# Patient Record
Sex: FEMALE | Race: BLACK OR AFRICAN AMERICAN | NOT HISPANIC OR LATINO | Employment: UNEMPLOYED | URBAN - METROPOLITAN AREA
[De-identification: names, ages, dates, MRNs, and addresses within clinical notes are randomized per-mention and may not be internally consistent; named-entity substitution may affect disease eponyms.]

---

## 2018-04-13 ENCOUNTER — HOSPITAL ENCOUNTER (EMERGENCY)
Facility: HOSPITAL | Age: 38
Discharge: HOME | End: 2018-04-14
Attending: EMERGENCY MEDICINE
Payer: MEDICAID

## 2018-04-13 DIAGNOSIS — R10.9 ABDOMINAL PAIN, UNSPECIFIED ABDOMINAL LOCATION: ICD-10-CM

## 2018-04-13 DIAGNOSIS — K86.1 CHRONIC PANCREATITIS, UNSPECIFIED PANCREATITIS TYPE: Primary | ICD-10-CM

## 2018-04-13 LAB
ALBUMIN SERPL BCP-MCNC: 4.1 G/DL
ALP SERPL-CCNC: 107 U/L
ALT SERPL W/O P-5'-P-CCNC: 25 U/L
ANION GAP SERPL CALC-SCNC: 12 MMOL/L
AST SERPL-CCNC: 45 U/L
B-HCG UR QL: NEGATIVE
BACTERIA #/AREA URNS HPF: NORMAL /HPF
BASOPHILS # BLD AUTO: 0.03 K/UL
BASOPHILS NFR BLD: 0.5 %
BILIRUB SERPL-MCNC: 0.3 MG/DL
BILIRUB UR QL STRIP: NEGATIVE
BUN SERPL-MCNC: 6 MG/DL
CALCIUM SERPL-MCNC: 9.6 MG/DL
CHLORIDE SERPL-SCNC: 104 MMOL/L
CLARITY UR: CLEAR
CO2 SERPL-SCNC: 23 MMOL/L
COLOR UR: YELLOW
CREAT SERPL-MCNC: 0.7 MG/DL
DIFFERENTIAL METHOD: ABNORMAL
EOSINOPHIL # BLD AUTO: 0.1 K/UL
EOSINOPHIL NFR BLD: 1.2 %
ERYTHROCYTE [DISTWIDTH] IN BLOOD BY AUTOMATED COUNT: 16.7 %
EST. GFR  (AFRICAN AMERICAN): >60 ML/MIN/1.73 M^2
EST. GFR  (NON AFRICAN AMERICAN): >60 ML/MIN/1.73 M^2
GLUCOSE SERPL-MCNC: 120 MG/DL
GLUCOSE UR QL STRIP: NEGATIVE
HCT VFR BLD AUTO: 33.9 %
HGB BLD-MCNC: 11 G/DL
HGB UR QL STRIP: NEGATIVE
KETONES UR QL STRIP: ABNORMAL
LEUKOCYTE ESTERASE UR QL STRIP: ABNORMAL
LIPASE SERPL-CCNC: 88 U/L
LYMPHOCYTES # BLD AUTO: 1.8 K/UL
LYMPHOCYTES NFR BLD: 30.5 %
MCH RBC QN AUTO: 23.7 PG
MCHC RBC AUTO-ENTMCNC: 32.4 G/DL
MCV RBC AUTO: 73 FL
MICROSCOPIC COMMENT: NORMAL
MONOCYTES # BLD AUTO: 0.5 K/UL
MONOCYTES NFR BLD: 7.9 %
NEUTROPHILS # BLD AUTO: 3.5 K/UL
NEUTROPHILS NFR BLD: 59.9 %
NITRITE UR QL STRIP: NEGATIVE
PH UR STRIP: 6 [PH] (ref 5–8)
PLATELET # BLD AUTO: 297 K/UL
PMV BLD AUTO: 10.3 FL
POTASSIUM SERPL-SCNC: 3.4 MMOL/L
PROT SERPL-MCNC: 8.3 G/DL
PROT UR QL STRIP: NEGATIVE
RBC # BLD AUTO: 4.64 M/UL
SODIUM SERPL-SCNC: 139 MMOL/L
SP GR UR STRIP: 1.02 (ref 1–1.03)
SQUAMOUS #/AREA URNS HPF: 1 /HPF
URN SPEC COLLECT METH UR: ABNORMAL
UROBILINOGEN UR STRIP-ACNC: NEGATIVE EU/DL
WBC # BLD AUTO: 5.8 K/UL
WBC #/AREA URNS HPF: 2 /HPF (ref 0–5)

## 2018-04-13 PROCEDURE — 99285 EMERGENCY DEPT VISIT HI MDM: CPT | Mod: 25

## 2018-04-13 PROCEDURE — 81025 URINE PREGNANCY TEST: CPT

## 2018-04-13 PROCEDURE — 96376 TX/PRO/DX INJ SAME DRUG ADON: CPT

## 2018-04-13 PROCEDURE — 63600175 PHARM REV CODE 636 W HCPCS: Performed by: EMERGENCY MEDICINE

## 2018-04-13 PROCEDURE — 83690 ASSAY OF LIPASE: CPT

## 2018-04-13 PROCEDURE — 85025 COMPLETE CBC W/AUTO DIFF WBC: CPT

## 2018-04-13 PROCEDURE — 96375 TX/PRO/DX INJ NEW DRUG ADDON: CPT

## 2018-04-13 PROCEDURE — 25000003 PHARM REV CODE 250: Performed by: EMERGENCY MEDICINE

## 2018-04-13 PROCEDURE — 81000 URINALYSIS NONAUTO W/SCOPE: CPT

## 2018-04-13 PROCEDURE — 96374 THER/PROPH/DIAG INJ IV PUSH: CPT | Mod: 59

## 2018-04-13 PROCEDURE — 80053 COMPREHEN METABOLIC PANEL: CPT

## 2018-04-13 PROCEDURE — 96361 HYDRATE IV INFUSION ADD-ON: CPT

## 2018-04-13 RX ORDER — DIPHENHYDRAMINE HYDROCHLORIDE 50 MG/ML
25 INJECTION INTRAMUSCULAR; INTRAVENOUS
Status: COMPLETED | OUTPATIENT
Start: 2018-04-13 | End: 2018-04-13

## 2018-04-13 RX ORDER — DIPHENHYDRAMINE HCL 25 MG
25 CAPSULE ORAL
Status: COMPLETED | OUTPATIENT
Start: 2018-04-13 | End: 2018-04-13

## 2018-04-13 RX ORDER — MORPHINE SULFATE 4 MG/ML
4 INJECTION, SOLUTION INTRAMUSCULAR; INTRAVENOUS
Status: COMPLETED | OUTPATIENT
Start: 2018-04-13 | End: 2018-04-13

## 2018-04-13 RX ORDER — ONDANSETRON 2 MG/ML
4 INJECTION INTRAMUSCULAR; INTRAVENOUS
Status: COMPLETED | OUTPATIENT
Start: 2018-04-13 | End: 2018-04-13

## 2018-04-13 RX ADMIN — DIPHENHYDRAMINE HYDROCHLORIDE 25 MG: 50 INJECTION, SOLUTION INTRAMUSCULAR; INTRAVENOUS at 09:04

## 2018-04-13 RX ADMIN — ONDANSETRON 4 MG: 2 INJECTION INTRAMUSCULAR; INTRAVENOUS at 09:04

## 2018-04-13 RX ADMIN — SODIUM CHLORIDE 1000 ML: 0.9 INJECTION, SOLUTION INTRAVENOUS at 09:04

## 2018-04-13 RX ADMIN — MORPHINE SULFATE 4 MG: 4 INJECTION INTRAVENOUS at 10:04

## 2018-04-13 RX ADMIN — DIPHENHYDRAMINE HYDROCHLORIDE 25 MG: 25 CAPSULE ORAL at 11:04

## 2018-04-13 RX ADMIN — MORPHINE SULFATE 4 MG: 4 INJECTION INTRAVENOUS at 09:04

## 2018-04-14 VITALS
RESPIRATION RATE: 18 BRPM | SYSTOLIC BLOOD PRESSURE: 130 MMHG | TEMPERATURE: 99 F | OXYGEN SATURATION: 97 % | HEIGHT: 64 IN | HEART RATE: 85 BPM | DIASTOLIC BLOOD PRESSURE: 90 MMHG

## 2018-04-14 PROBLEM — R10.9 ABDOMINAL PAIN: Status: ACTIVE | Noted: 2018-04-14

## 2018-04-14 PROCEDURE — 25000003 PHARM REV CODE 250: Performed by: EMERGENCY MEDICINE

## 2018-04-14 PROCEDURE — 25500020 PHARM REV CODE 255: Performed by: EMERGENCY MEDICINE

## 2018-04-14 RX ORDER — ONDANSETRON 4 MG/1
4 TABLET, FILM COATED ORAL EVERY 8 HOURS PRN
Qty: 12 TABLET | Refills: 0 | Status: SHIPPED | OUTPATIENT
Start: 2018-04-14

## 2018-04-14 RX ORDER — NAPROXEN 500 MG/1
500 TABLET ORAL
Status: COMPLETED | OUTPATIENT
Start: 2018-04-14 | End: 2018-04-14

## 2018-04-14 RX ORDER — NAPROXEN 500 MG/1
500 TABLET ORAL 2 TIMES DAILY WITH MEALS
Qty: 20 TABLET | Refills: 0 | Status: ON HOLD | OUTPATIENT
Start: 2018-04-14 | End: 2023-12-28 | Stop reason: HOSPADM

## 2018-04-14 RX ORDER — TRAMADOL HYDROCHLORIDE 50 MG/1
50 TABLET ORAL EVERY 6 HOURS PRN
Qty: 12 TABLET | Refills: 0 | Status: SHIPPED | OUTPATIENT
Start: 2018-04-14 | End: 2018-04-24

## 2018-04-14 RX ADMIN — IOHEXOL 75 ML: 350 INJECTION, SOLUTION INTRAVENOUS at 12:04

## 2018-04-14 RX ADMIN — NAPROXEN 500 MG: 500 TABLET ORAL at 01:04

## 2018-04-14 NOTE — DISCHARGE INSTRUCTIONS
Regarding ABDOMINAL PAIN, I recommended that the patient: Sip water or other clear fluids; avoid solid food for the first few hours after vomiting or diarrhea; if vomiting, wait 6 hours, and then eat small amounts of mild foods such as rice, applesauce, or crackers; avoid dairy products; avoid citrus, high-fat foods, fried or greasy foods, tomato products, caffeine, alcohol, and carbonated beverages;  avoid aspirin, ibuprofen or other anti-inflammatory medications, and narcotic pain medications unless prescribed.  In regards to prevention, I encouraged patient to:  Avoid fatty or greasy foods; drink plenty of water each day; eat small meals more frequently; exercise regularly; limit foods that produce gas; make sure meals are well-balanced and high in fiber and include plenty of fruits and vegetables.

## 2018-04-14 NOTE — ED PROVIDER NOTES
SCRIBE #1 NOTE: I, Saul Chappell, am scribing for, and in the presence of, Delmer Garvey MD. I have scribed the HPI, ROS, and PEx.     SCRIBE #2 NOTE: I, Na Anaya, am scribing for, and in the presence of,  Jose Enrique Walden Jr., MD. I have scribed the remaining portions of the note not scribed by Scribe #1.     History      Chief Complaint   Patient presents with    Abdominal Pain     reports hx of pancreatitis with nausea vomiting and diarrhea today        Review of patient's allergies indicates:   Allergen Reactions    Ace inhibitors Swelling     angioedema    Pcn [penicillins] Hives        HPI   HPI    4/13/2018, 8:40 PM   History obtained from the patient      History of Present Illness: Avril Mallory is a 37 y.o. female patient who presents to the Emergency Department for an evaluation of abdominal pain which onset gradually x2 days ago. Pt reports a hx of pancreatitis and states her current pain is similar. Pt reports she attempted to meet her PCP concerning her sx but was advised to come here due to currently changing her insurance. Symptoms are constant and moderate in severity. Exacerbated by palpation and relieved by nothing. Associated sxs include N/V/D. Patient denies any fever, chills, dysuria, hematuria, constipation, blood in stool, and all other sxs at this time. No further complaints or concerns at this time.     Arrival mode: Personal vehicle    PCP: Melisa Yang MD       Past Medical History:  Past Medical History:   Diagnosis Date    Hypertension     Pancreatitis     Pancreatitis        Past Surgical History:  Past Surgical History:   Procedure Laterality Date    GASTRIC BYPASS           Family History:  Family History   Problem Relation Age of Onset    Cancer Mother     No Known Problems Father        Social History:  Social History     Social History Main Topics    Smoking status: Never Smoker    Smokeless tobacco: Unknown    Alcohol use Yes      Comment: ocassional     Drug use: No    Sexual activity: Unknown       ROS   Review of Systems   Constitutional: Negative for activity change, appetite change, chills and fever.   HENT: Negative for congestion, sore throat and trouble swallowing.    Respiratory: Negative for cough and shortness of breath.    Cardiovascular: Negative for chest pain and leg swelling.   Gastrointestinal: Positive for abdominal pain, diarrhea, nausea and vomiting. Negative for abdominal distention, blood in stool and constipation.   Genitourinary: Negative for dysuria, frequency, hematuria, pelvic pain, urgency, vaginal bleeding and vaginal discharge.   Musculoskeletal: Negative for back pain and neck pain.   Skin: Negative for rash.   Neurological: Negative for dizziness, weakness, light-headedness and headaches.     Physical Exam      Initial Vitals [04/13/18 2012]   BP Pulse Resp Temp SpO2   (!) 160/99 (!) 111 20 98.6 °F (37 °C) 98 %      MAP       119.33          Physical Exam  Nursing Notes and Vital Signs Reviewed.  Constitutional: Patient is in no acute distress. Well-developed and well-nourished.  Head: Atraumatic. Normocephalic.  Eyes: PERRL. EOM intact. Conjunctivae are not pale. No scleral icterus.  ENT: Mucous membranes are moist. Oropharynx is clear and symmetric.    Neck: Supple. Full ROM. No lymphadenopathy.  Cardiovascular: Regular rate. Regular rhythm.  Pulmonary/Chest: No respiratory distress. Clear to auscultation bilaterally. No wheezing or rales.  Abdominal: Soft and non-distended.  Epigastric tenderness. No guarding or rebound noted.   Musculoskeletal: Moves all extremities. No obvious deformities. No edema. No calf tenderness.  Skin: Warm and dry.  Neurological:  Alert, awake, and appropriate.  Normal speech.  No acute focal neurological deficits are appreciated.  Psychiatric: Normal affect. Good eye contact. Appropriate in content.    ED Course    Procedures  ED Vital Signs:  Vitals:    04/13/18 2012 04/13/18 2227 04/13/18 2330  "04/14/18 0040   BP: (!) 160/99 (!) 140/93 (!) 145/91 (!) 130/90   Pulse: (!) 111 90 91 85   Resp: 20 18 18 18   Temp: 98.6 °F (37 °C)      TempSrc: Oral      SpO2: 98% 97% 97% 97%   Height: 5' 4" (1.626 m)          Abnormal Lab Results:  Labs Reviewed   CBC W/ AUTO DIFFERENTIAL - Abnormal; Notable for the following:        Result Value    Hemoglobin 11.0 (*)     Hematocrit 33.9 (*)     MCV 73 (*)     MCH 23.7 (*)     RDW 16.7 (*)     All other components within normal limits   COMPREHENSIVE METABOLIC PANEL - Abnormal; Notable for the following:     Potassium 3.4 (*)     Glucose 120 (*)     AST 45 (*)     All other components within normal limits   LIPASE - Abnormal; Notable for the following:     Lipase 88 (*)     All other components within normal limits   URINALYSIS - Abnormal; Notable for the following:     Ketones, UA Trace (*)     Leukocytes, UA Trace (*)     All other components within normal limits   PREGNANCY TEST, URINE RAPID   URINALYSIS MICROSCOPIC        All Lab Results:  Results for orders placed or performed during the hospital encounter of 04/13/18   CBC W/ AUTO DIFFERENTIAL   Result Value Ref Range    WBC 5.80 3.90 - 12.70 K/uL    RBC 4.64 4.00 - 5.40 M/uL    Hemoglobin 11.0 (L) 12.0 - 16.0 g/dL    Hematocrit 33.9 (L) 37.0 - 48.5 %    MCV 73 (L) 82 - 98 fL    MCH 23.7 (L) 27.0 - 31.0 pg    MCHC 32.4 32.0 - 36.0 g/dL    RDW 16.7 (H) 11.5 - 14.5 %    Platelets 297 150 - 350 K/uL    MPV 10.3 9.2 - 12.9 fL    Gran # (ANC) 3.5 1.8 - 7.7 K/uL    Lymph # 1.8 1.0 - 4.8 K/uL    Mono # 0.5 0.3 - 1.0 K/uL    Eos # 0.1 0.0 - 0.5 K/uL    Baso # 0.03 0.00 - 0.20 K/uL    Gran% 59.9 38.0 - 73.0 %    Lymph% 30.5 18.0 - 48.0 %    Mono% 7.9 4.0 - 15.0 %    Eosinophil% 1.2 0.0 - 8.0 %    Basophil% 0.5 0.0 - 1.9 %    Differential Method Automated    Comp. Metabolic Panel   Result Value Ref Range    Sodium 139 136 - 145 mmol/L    Potassium 3.4 (L) 3.5 - 5.1 mmol/L    Chloride 104 95 - 110 mmol/L    CO2 23 23 - 29 mmol/L "    Glucose 120 (H) 70 - 110 mg/dL    BUN, Bld 6 6 - 20 mg/dL    Creatinine 0.7 0.5 - 1.4 mg/dL    Calcium 9.6 8.7 - 10.5 mg/dL    Total Protein 8.3 6.0 - 8.4 g/dL    Albumin 4.1 3.5 - 5.2 g/dL    Total Bilirubin 0.3 0.1 - 1.0 mg/dL    Alkaline Phosphatase 107 55 - 135 U/L    AST 45 (H) 10 - 40 U/L    ALT 25 10 - 44 U/L    Anion Gap 12 8 - 16 mmol/L    eGFR if African American >60 >60 mL/min/1.73 m^2    eGFR if non African American >60 >60 mL/min/1.73 m^2   Lipase   Result Value Ref Range    Lipase 88 (H) 4 - 60 U/L   Urinalysis - Clean Catch   Result Value Ref Range    Specimen UA Urine, Clean Catch     Color, UA Yellow Yellow, Straw, Eliane    Appearance, UA Clear Clear    pH, UA 6.0 5.0 - 8.0    Specific Gravity, UA 1.020 1.005 - 1.030    Protein, UA Negative Negative    Glucose, UA Negative Negative    Ketones, UA Trace (A) Negative    Bilirubin (UA) Negative Negative    Occult Blood UA Negative Negative    Nitrite, UA Negative Negative    Urobilinogen, UA Negative <2.0 EU/dL    Leukocytes, UA Trace (A) Negative   Pregnancy, urine rapid   Result Value Ref Range    Preg Test, Ur Negative    Urinalysis Microscopic   Result Value Ref Range    WBC, UA 2 0 - 5 /hpf    Bacteria, UA Rare None-Occ /hpf    Squam Epithel, UA 1 /hpf    Microscopic Comment SEE COMMENT        Imaging Results:  Imaging Results          CT Abdomen Pelvis With Contrast (Final result)  Result time 04/14/18 08:01:01    Final result by Brenda Levine MD (04/14/18 08:01:01)                 Impression:      There are no acute findings.  No CT evidence of pancreatitis.            All CT scans at this facility use dose modulation, iterative reconstruction, and/or weight based dosing when appropriate to reduce radiation dose to as low as reasonably achievable.      Electronically signed by: BRENDA LEVINE MD  Date:     04/14/18  Time:    08:01              Narrative:    EXAM: CT ABDOMEN PELVIS WITH CONTRAST    CLINICAL HISTORY: 2 days of increasing  abdominal pain with history of pancreatitis and gastric bypass surgeries     TECHNIQUE: Axial CT scan through the abdomen and pelvis following IV and without oral contrast.    COMPARISON STUDIES: October 24, 2013 abdomen CT    FINDINGS:    Abdomen CT:   A normal liver, spleen, adrenals.  Mild symmetrical fullness renal collecting systems with no calculi.  Symmetric enhancement.  Cholecystectomy.  Normal enhancement pancreas without inflammatory change or fluid.  Postsurgical changes from gastric bypass procedure.  The bowel is nonobstructed.  A normal appendix.  No abdominal hernias.  Normal enhancement of the vasculature.    Pelvis CT:   No fluid collections.  The uterus and adnexa are unremarkable.  No hernias or adenopathy.  Minimal smooth, concentric bladder wall prominence without surrounding inflammatory change.                             X-Ray Abdomen Flat And Erect (Final result)  Result time 04/13/18 23:12:06    Final result by Danica Stern MD (Timothy) (04/13/18 23:12:06)                 Impression:     Nonspecific, nonobstructed bowel gas pattern.      Electronically signed by: DANICA STERN MD  Date:     04/13/18  Time:    23:12              Narrative:    Abdomen, 2 views    Clinical history: abdominal pain    Findings: Previous cholecystectomy.  Nonspecific, nonobstructed bowel gas pattern.  There is contrast noted in the small bowel.  There is an average amount of stool present. There are no unusual calcifications. The bones are unremarkable. There is no intraperitoneal free air. The lung bases are clear.                             12:52 AM: Per [facility] radiology, pt's CT results: Postsurgical change related to Mervin-en-Y gastric bypass surgery without evidence of bowel obstruction or other acute abnormality. The appendix is at the upper limits of normal in caliber measuring 8mm, otherwise the appendix appears normal.            The Emergency Provider reviewed the vital signs and test results, which  are outlined above.    ED Discussion     9:24 PM: Re-evaluated pt. Pt is requesting pain medication and reports that she normally gets morphine for her abdominal pain but has to have it with benadryl because morphine makes her itch.    11:08 PM: Dr. Garvey transfers care of pt to Dr. Walden, pending imaging results.    1:09 AM: Reassessed pt at this time. Agree with Dr. Garvey's assessment. Pt is AAOx3, in NAD, and VSS. Pt states her condition has improved at this time. Discussed with pt all pertinent ED information and results. Advised pt to f/u outpatient with her PCP and GI. Discussed pt dx and plan of tx. Gave pt all f/u and return to the ED instructions. All questions and concerns were addressed at this time. Pt expresses understanding of information and instructions, and is comfortable with plan to discharge. Pt is stable for discharge.    I discussed with patient and/or family/caretaker that evaluation in the ED does not suggest any emergent or life threatening medical conditions requiring immediate intervention beyond what was provided in the ED, and I believe patient is safe for discharge.  Regardless, an unremarkable evaluation in the ED does not preclude the development or presence of a serious of life threatening condition. As such, patient was instructed to return immediately for any worsening or change in current symptoms.    ED Medication(s):  Medications   sodium chloride 0.9% bolus 1,000 mL (0 mLs Intravenous Stopped 4/13/18 2227)   ondansetron injection 4 mg (4 mg Intravenous Given 4/13/18 2114)   morphine injection 4 mg (4 mg Intravenous Given 4/13/18 2130)   diphenhydrAMINE injection 25 mg (25 mg Intravenous Given 4/13/18 2129)   morphine injection 4 mg (4 mg Intravenous Given 4/13/18 2234)   diphenhydrAMINE capsule 25 mg (25 mg Oral Given 4/13/18 2333)   omnipaque 350 iohexol 75 mL (75 mLs Intravenous Given 4/14/18 0003)   naproxen tablet 500 mg (500 mg Oral Given 4/14/18 0110)       Discharge  Medication List as of 4/14/2018  1:11 AM      START taking these medications    Details   naproxen (NAPROSYN) 500 MG tablet Take 1 tablet (500 mg total) by mouth 2 (two) times daily with meals., Starting Sat 4/14/2018, Print      ondansetron (ZOFRAN) 4 MG tablet Take 1 tablet (4 mg total) by mouth every 8 (eight) hours as needed., Starting Sat 4/14/2018, Print      traMADol (ULTRAM) 50 mg tablet Take 1 tablet (50 mg total) by mouth every 6 (six) hours as needed for Pain., Starting Sat 4/14/2018, Until Tue 4/24/2018, Print             Follow-up Information     Melisa Yang MD. Schedule an appointment as soon as possible for a visit in 1 week.    Specialty:  Internal Medicine  Contact information:  [address] [facility] [address]  [phone]             [facility] - Internal Medicine. Schedule an appointment as soon as possible for a visit in 1 week.    Specialty:  Internal Medicine  Contact information:  [address] [facility] [id number] [phone]  Additional information:  (off [email or web address]) 1st floor           [facility] - Gastroenterology. Schedule an appointment as soon as possible for a visit in 1 week.    Specialty:  Gastroenterology  Contact information:  [address] [facility] [id number] [phone]  Additional information:  (off [email or web address]) 3rd flood                   Medical Decision Making    Medical Decision Making:   Clinical Tests:   Lab Tests: Ordered and Reviewed  Radiological Study: Reviewed and Ordered           Scribe Attestation:   Scribe #1: I performed the above scribed service and the documentation accurately describes the services I performed. I attest to the accuracy of the note.    Attending:   Physician Attestation Statement for Scribe #1: I, Delmer Garvey MD, personally performed the services described in this documentation, as scribed by Saul Chappell, in my presence, and it is both accurate and complete.       Scribe Attestation:   Scribe  #2: I performed the above scribed service and the documentation accurately describes the services I performed. I attest to the accuracy of the note.    Attending Attestation:           Physician Attestation for Scribe:    Physician Attestation Statement for Scribe #2: I, Jose Enrique Walden Jr., MD, reviewed documentation, as scribed by Na Anaya in my presence, and it is both accurate and complete. I also acknowledge and confirm the content of the note done by Ranjana #1.          Clinical Impression       ICD-10-CM ICD-9-CM   1. Chronic pancreatitis, unspecified pancreatitis type K86.1 577.1   2. Abdominal pain, unspecified abdominal location R10.9 789.00       Disposition:   Disposition: Discharged  Condition: Stable         Jose Enrique Walden Jr., MD  04/16/18 1690

## 2023-12-27 ENCOUNTER — HOSPITAL ENCOUNTER (INPATIENT)
Facility: HOSPITAL | Age: 43
LOS: 1 days | Discharge: HOME | DRG: 812 | End: 2023-12-28
Attending: EMERGENCY MEDICINE | Admitting: INTERNAL MEDICINE
Payer: MEDICARE

## 2023-12-27 DIAGNOSIS — D50.9 IRON DEFICIENCY ANEMIA, UNSPECIFIED IRON DEFICIENCY ANEMIA TYPE: Primary | ICD-10-CM

## 2023-12-27 DIAGNOSIS — K74.60 HEPATIC CIRRHOSIS, UNSPECIFIED HEPATIC CIRRHOSIS TYPE, UNSPECIFIED WHETHER ASCITES PRESENT: ICD-10-CM

## 2023-12-27 DIAGNOSIS — R07.9 CHEST PAIN: ICD-10-CM

## 2023-12-27 DIAGNOSIS — R07.9 CHEST PAIN, UNSPECIFIED TYPE: ICD-10-CM

## 2023-12-27 PROBLEM — R32 URINARY INCONTINENCE: Status: ACTIVE | Noted: 2020-07-15

## 2023-12-27 PROBLEM — R60.0 BILATERAL LOWER EXTREMITY EDEMA: Status: ACTIVE | Noted: 2019-08-21

## 2023-12-27 PROBLEM — F41.9 ANXIETY: Status: ACTIVE | Noted: 2020-12-24

## 2023-12-27 PROBLEM — K83.8 DILATED BILE DUCT: Status: ACTIVE | Noted: 2017-07-24

## 2023-12-27 PROBLEM — R19.7 DIARRHEA: Status: ACTIVE | Noted: 2018-02-22

## 2023-12-27 PROBLEM — E03.8 OTHER SPECIFIED HYPOTHYROIDISM: Status: ACTIVE | Noted: 2020-12-24

## 2023-12-27 PROBLEM — I87.2 VENOUS STASIS DERMATITIS OF BOTH LOWER EXTREMITIES: Status: ACTIVE | Noted: 2020-04-22

## 2023-12-27 PROBLEM — F10.10 ALCOHOL ABUSE: Status: ACTIVE | Noted: 2018-03-31

## 2023-12-27 PROBLEM — K92.2 GI BLEED: Status: ACTIVE | Noted: 2023-12-27

## 2023-12-27 PROBLEM — F12.10 MARIJUANA ABUSE: Status: ACTIVE | Noted: 2018-03-31

## 2023-12-27 PROBLEM — D50.8 IRON DEFICIENCY ANEMIA SECONDARY TO INADEQUATE DIETARY IRON INTAKE: Status: ACTIVE | Noted: 2020-11-16

## 2023-12-27 PROBLEM — Z98.84 HISTORY OF GASTRIC BYPASS: Chronic | Status: ACTIVE | Noted: 2017-08-24

## 2023-12-27 PROBLEM — K70.31 ALCOHOLIC CIRRHOSIS OF LIVER WITH ASCITES: Status: ACTIVE | Noted: 2019-09-03

## 2023-12-27 PROBLEM — D64.9 SEVERE ANEMIA: Status: ACTIVE | Noted: 2023-12-27

## 2023-12-27 PROBLEM — R17 TOTAL BILIRUBIN, ELEVATED: Status: ACTIVE | Noted: 2020-11-12

## 2023-12-27 PROBLEM — K72.90 DECOMPENSATED HEPATIC CIRRHOSIS: Status: ACTIVE | Noted: 2020-12-05

## 2023-12-27 PROBLEM — G62.9 NEUROPATHY: Chronic | Status: ACTIVE | Noted: 2020-07-15

## 2023-12-27 PROBLEM — E43 SEVERE PROTEIN-CALORIE MALNUTRITION: Status: ACTIVE | Noted: 2020-11-16

## 2023-12-27 LAB
ABO + RH BLD: NORMAL
ALBUMIN SERPL BCP-MCNC: 2.7 G/DL (ref 3.5–5.2)
ALP SERPL-CCNC: 160 U/L (ref 55–135)
ALT SERPL W/O P-5'-P-CCNC: 17 U/L (ref 10–44)
AMMONIA PLAS-SCNC: 102 UMOL/L (ref 10–50)
ANION GAP SERPL CALC-SCNC: 10 MMOL/L (ref 8–16)
ANISOCYTOSIS BLD QL SMEAR: SLIGHT
APTT PPP: 30.2 SEC (ref 21–32)
AST SERPL-CCNC: 82 U/L (ref 10–40)
BASOPHILS # BLD AUTO: 0.02 K/UL (ref 0–0.2)
BASOPHILS # BLD AUTO: 0.04 K/UL (ref 0–0.2)
BASOPHILS NFR BLD: 0.3 % (ref 0–1.9)
BASOPHILS NFR BLD: 0.5 % (ref 0–1.9)
BILIRUB SERPL-MCNC: 2.4 MG/DL (ref 0.1–1)
BLD GP AB SCN CELLS X3 SERPL QL: NORMAL
BLD PROD TYP BPU: NORMAL
BLOOD UNIT EXPIRATION DATE: NORMAL
BLOOD UNIT TYPE CODE: 5100
BLOOD UNIT TYPE: NORMAL
BNP SERPL-MCNC: 222 PG/ML (ref 0–99)
BUN SERPL-MCNC: 17 MG/DL (ref 6–20)
CALCIUM SERPL-MCNC: 7.6 MG/DL (ref 8.7–10.5)
CHLORIDE SERPL-SCNC: 107 MMOL/L (ref 95–110)
CO2 SERPL-SCNC: 21 MMOL/L (ref 23–29)
CODING SYSTEM: NORMAL
CREAT SERPL-MCNC: 1.1 MG/DL (ref 0.5–1.4)
CROSSMATCH INTERPRETATION: NORMAL
DIFFERENTIAL METHOD BLD: ABNORMAL
DIFFERENTIAL METHOD BLD: ABNORMAL
DISPENSE STATUS: NORMAL
EOSINOPHIL # BLD AUTO: 0 K/UL (ref 0–0.5)
EOSINOPHIL # BLD AUTO: 0 K/UL (ref 0–0.5)
EOSINOPHIL NFR BLD: 0.4 % (ref 0–8)
EOSINOPHIL NFR BLD: 0.4 % (ref 0–8)
ERYTHROCYTE [DISTWIDTH] IN BLOOD BY AUTOMATED COUNT: 16.5 % (ref 11.5–14.5)
ERYTHROCYTE [DISTWIDTH] IN BLOOD BY AUTOMATED COUNT: 22 % (ref 11.5–14.5)
EST. GFR  (NO RACE VARIABLE): >60 ML/MIN/1.73 M^2
FERRITIN SERPL-MCNC: 29 NG/ML (ref 20–300)
GLUCOSE SERPL-MCNC: 121 MG/DL (ref 70–110)
HCT VFR BLD AUTO: 15.3 % (ref 37–48.5)
HCT VFR BLD AUTO: 21.8 % (ref 37–48.5)
HCV AB SERPL QL IA: NEGATIVE
HEP C VIRUS HOLD SPECIMEN: NORMAL
HGB BLD-MCNC: 4.5 G/DL (ref 12–16)
HGB BLD-MCNC: 6.9 G/DL (ref 12–16)
HIV 1+2 AB+HIV1 P24 AG SERPL QL IA: NEGATIVE
HYPOCHROMIA BLD QL SMEAR: ABNORMAL
IMM GRANULOCYTES # BLD AUTO: 0.05 K/UL (ref 0–0.04)
IMM GRANULOCYTES # BLD AUTO: 0.07 K/UL (ref 0–0.04)
IMM GRANULOCYTES NFR BLD AUTO: 0.7 % (ref 0–0.5)
IMM GRANULOCYTES NFR BLD AUTO: 0.9 % (ref 0–0.5)
INR PPP: 1.9 (ref 0.8–1.2)
LIPASE SERPL-CCNC: 11 U/L (ref 4–60)
LYMPHOCYTES # BLD AUTO: 0.9 K/UL (ref 1–4.8)
LYMPHOCYTES # BLD AUTO: 1.2 K/UL (ref 1–4.8)
LYMPHOCYTES NFR BLD: 11.9 % (ref 18–48)
LYMPHOCYTES NFR BLD: 14.7 % (ref 18–48)
MAGNESIUM SERPL-MCNC: 1.7 MG/DL (ref 1.6–2.6)
MCH RBC QN AUTO: 21.2 PG (ref 27–31)
MCH RBC QN AUTO: 24.7 PG (ref 27–31)
MCHC RBC AUTO-ENTMCNC: 29.4 G/DL (ref 32–36)
MCHC RBC AUTO-ENTMCNC: 31.7 G/DL (ref 32–36)
MCV RBC AUTO: 72 FL (ref 82–98)
MCV RBC AUTO: 78 FL (ref 82–98)
MONOCYTES # BLD AUTO: 0.6 K/UL (ref 0.3–1)
MONOCYTES # BLD AUTO: 0.9 K/UL (ref 0.3–1)
MONOCYTES NFR BLD: 11.4 % (ref 4–15)
MONOCYTES NFR BLD: 7.4 % (ref 4–15)
NEUTROPHILS # BLD AUTO: 5.9 K/UL (ref 1.8–7.7)
NEUTROPHILS # BLD AUTO: 5.9 K/UL (ref 1.8–7.7)
NEUTROPHILS NFR BLD: 72.1 % (ref 38–73)
NEUTROPHILS NFR BLD: 79.3 % (ref 38–73)
NRBC BLD-RTO: 2 /100 WBC
NRBC BLD-RTO: 5 /100 WBC
NUM UNITS TRANS PACKED RBC: NORMAL
OB PNL STL: POSITIVE
OVALOCYTES BLD QL SMEAR: ABNORMAL
PHOSPHATE SERPL-MCNC: 1.6 MG/DL (ref 2.7–4.5)
PLATELET # BLD AUTO: 67 K/UL (ref 150–450)
PLATELET # BLD AUTO: 68 K/UL (ref 150–450)
PMV BLD AUTO: ABNORMAL FL (ref 9.2–12.9)
PMV BLD AUTO: ABNORMAL FL (ref 9.2–12.9)
POLYCHROMASIA BLD QL SMEAR: ABNORMAL
POTASSIUM SERPL-SCNC: 3.6 MMOL/L (ref 3.5–5.1)
PROT SERPL-MCNC: 5.6 G/DL (ref 6–8.4)
PROTHROMBIN TIME: 18.8 SEC (ref 9–12.5)
RBC # BLD AUTO: 2.12 M/UL (ref 4–5.4)
RBC # BLD AUTO: 2.79 M/UL (ref 4–5.4)
SODIUM SERPL-SCNC: 138 MMOL/L (ref 136–145)
SPECIMEN OUTDATE: NORMAL
TARGETS BLD QL SMEAR: ABNORMAL
TROPONIN I SERPL DL<=0.01 NG/ML-MCNC: <0.006 NG/ML (ref 0–0.03)
TROPONIN I SERPL DL<=0.01 NG/ML-MCNC: <0.006 NG/ML (ref 0–0.03)
WBC # BLD AUTO: 7.46 K/UL (ref 3.9–12.7)
WBC # BLD AUTO: 8.18 K/UL (ref 3.9–12.7)

## 2023-12-27 PROCEDURE — 83036 HEMOGLOBIN GLYCOSYLATED A1C: CPT | Performed by: NURSE PRACTITIONER

## 2023-12-27 PROCEDURE — 83735 ASSAY OF MAGNESIUM: CPT | Performed by: NURSE PRACTITIONER

## 2023-12-27 PROCEDURE — 83690 ASSAY OF LIPASE: CPT | Performed by: EMERGENCY MEDICINE

## 2023-12-27 PROCEDURE — 96376 TX/PRO/DX INJ SAME DRUG ADON: CPT

## 2023-12-27 PROCEDURE — 83540 ASSAY OF IRON: CPT | Performed by: NURSE PRACTITIONER

## 2023-12-27 PROCEDURE — 85025 COMPLETE CBC W/AUTO DIFF WBC: CPT | Mod: 91 | Performed by: NURSE PRACTITIONER

## 2023-12-27 PROCEDURE — 21400001 HC TELEMETRY ROOM

## 2023-12-27 PROCEDURE — 25000003 PHARM REV CODE 250: Performed by: INTERNAL MEDICINE

## 2023-12-27 PROCEDURE — 36430 TRANSFUSION BLD/BLD COMPNT: CPT

## 2023-12-27 PROCEDURE — 86901 BLOOD TYPING SEROLOGIC RH(D): CPT | Performed by: EMERGENCY MEDICINE

## 2023-12-27 PROCEDURE — 83880 ASSAY OF NATRIURETIC PEPTIDE: CPT | Performed by: EMERGENCY MEDICINE

## 2023-12-27 PROCEDURE — 93005 ELECTROCARDIOGRAM TRACING: CPT

## 2023-12-27 PROCEDURE — 85730 THROMBOPLASTIN TIME PARTIAL: CPT | Performed by: EMERGENCY MEDICINE

## 2023-12-27 PROCEDURE — 36415 COLL VENOUS BLD VENIPUNCTURE: CPT | Performed by: EMERGENCY MEDICINE

## 2023-12-27 PROCEDURE — 63600175 PHARM REV CODE 636 W HCPCS: Performed by: EMERGENCY MEDICINE

## 2023-12-27 PROCEDURE — 30233N1 TRANSFUSION OF NONAUTOLOGOUS RED BLOOD CELLS INTO PERIPHERAL VEIN, PERCUTANEOUS APPROACH: ICD-10-PCS | Performed by: EMERGENCY MEDICINE

## 2023-12-27 PROCEDURE — 85025 COMPLETE CBC W/AUTO DIFF WBC: CPT | Performed by: EMERGENCY MEDICINE

## 2023-12-27 PROCEDURE — 99285 EMERGENCY DEPT VISIT HI MDM: CPT | Mod: 25

## 2023-12-27 PROCEDURE — 96374 THER/PROPH/DIAG INJ IV PUSH: CPT

## 2023-12-27 PROCEDURE — 84100 ASSAY OF PHOSPHORUS: CPT | Performed by: NURSE PRACTITIONER

## 2023-12-27 PROCEDURE — 82272 OCCULT BLD FECES 1-3 TESTS: CPT | Performed by: EMERGENCY MEDICINE

## 2023-12-27 PROCEDURE — 80053 COMPREHEN METABOLIC PANEL: CPT | Performed by: EMERGENCY MEDICINE

## 2023-12-27 PROCEDURE — 82140 ASSAY OF AMMONIA: CPT | Performed by: NURSE PRACTITIONER

## 2023-12-27 PROCEDURE — 85610 PROTHROMBIN TIME: CPT | Performed by: EMERGENCY MEDICINE

## 2023-12-27 PROCEDURE — 84484 ASSAY OF TROPONIN QUANT: CPT | Performed by: EMERGENCY MEDICINE

## 2023-12-27 PROCEDURE — 93010 ELECTROCARDIOGRAM REPORT: CPT | Mod: ,,, | Performed by: INTERNAL MEDICINE

## 2023-12-27 PROCEDURE — 36415 COLL VENOUS BLD VENIPUNCTURE: CPT | Mod: XB | Performed by: NURSE PRACTITIONER

## 2023-12-27 PROCEDURE — 86803 HEPATITIS C AB TEST: CPT | Performed by: EMERGENCY MEDICINE

## 2023-12-27 PROCEDURE — 63600175 PHARM REV CODE 636 W HCPCS: Performed by: NURSE PRACTITIONER

## 2023-12-27 PROCEDURE — 82728 ASSAY OF FERRITIN: CPT | Performed by: NURSE PRACTITIONER

## 2023-12-27 PROCEDURE — 25000003 PHARM REV CODE 250: Performed by: NURSE PRACTITIONER

## 2023-12-27 PROCEDURE — 87389 HIV-1 AG W/HIV-1&-2 AB AG IA: CPT | Performed by: EMERGENCY MEDICINE

## 2023-12-27 PROCEDURE — 99499 UNLISTED E&M SERVICE: CPT | Mod: ,,, | Performed by: INTERNAL MEDICINE

## 2023-12-27 PROCEDURE — 96375 TX/PRO/DX INJ NEW DRUG ADDON: CPT

## 2023-12-27 PROCEDURE — 96361 HYDRATE IV INFUSION ADD-ON: CPT

## 2023-12-27 PROCEDURE — C9113 INJ PANTOPRAZOLE SODIUM, VIA: HCPCS | Performed by: NURSE PRACTITIONER

## 2023-12-27 PROCEDURE — 25000003 PHARM REV CODE 250: Performed by: EMERGENCY MEDICINE

## 2023-12-27 PROCEDURE — P9016 RBC LEUKOCYTES REDUCED: HCPCS | Performed by: EMERGENCY MEDICINE

## 2023-12-27 PROCEDURE — 86920 COMPATIBILITY TEST SPIN: CPT | Performed by: EMERGENCY MEDICINE

## 2023-12-27 RX ORDER — ONDANSETRON 2 MG/ML
4 INJECTION INTRAMUSCULAR; INTRAVENOUS EVERY 8 HOURS PRN
Status: DISCONTINUED | OUTPATIENT
Start: 2023-12-27 | End: 2023-12-28 | Stop reason: HOSPADM

## 2023-12-27 RX ORDER — FENTANYL CITRATE 50 UG/ML
25 INJECTION, SOLUTION INTRAMUSCULAR; INTRAVENOUS
Status: COMPLETED | OUTPATIENT
Start: 2023-12-27 | End: 2023-12-27

## 2023-12-27 RX ORDER — IBUPROFEN 200 MG
24 TABLET ORAL
Status: DISCONTINUED | OUTPATIENT
Start: 2023-12-27 | End: 2023-12-28 | Stop reason: HOSPADM

## 2023-12-27 RX ORDER — NALOXONE HCL 0.4 MG/ML
0.02 VIAL (ML) INJECTION
Status: DISCONTINUED | OUTPATIENT
Start: 2023-12-27 | End: 2023-12-28 | Stop reason: HOSPADM

## 2023-12-27 RX ORDER — MORPHINE SULFATE 4 MG/ML
2 INJECTION, SOLUTION INTRAMUSCULAR; INTRAVENOUS
Status: COMPLETED | OUTPATIENT
Start: 2023-12-27 | End: 2023-12-27

## 2023-12-27 RX ORDER — LACTULOSE 10 G/15ML
15 SOLUTION ORAL 3 TIMES DAILY
Status: DISCONTINUED | OUTPATIENT
Start: 2023-12-27 | End: 2023-12-28 | Stop reason: HOSPADM

## 2023-12-27 RX ORDER — IBUPROFEN 200 MG
16 TABLET ORAL
Status: DISCONTINUED | OUTPATIENT
Start: 2023-12-27 | End: 2023-12-28 | Stop reason: HOSPADM

## 2023-12-27 RX ORDER — OXYCODONE HYDROCHLORIDE 5 MG/1
5 TABLET ORAL EVERY 6 HOURS PRN
Status: DISCONTINUED | OUTPATIENT
Start: 2023-12-27 | End: 2023-12-28 | Stop reason: HOSPADM

## 2023-12-27 RX ORDER — SODIUM CHLORIDE 0.9 % (FLUSH) 0.9 %
10 SYRINGE (ML) INJECTION EVERY 12 HOURS PRN
Status: DISCONTINUED | OUTPATIENT
Start: 2023-12-27 | End: 2023-12-28 | Stop reason: HOSPADM

## 2023-12-27 RX ORDER — PANTOPRAZOLE SODIUM 40 MG/10ML
40 INJECTION, POWDER, LYOPHILIZED, FOR SOLUTION INTRAVENOUS 2 TIMES DAILY
Status: DISCONTINUED | OUTPATIENT
Start: 2023-12-27 | End: 2023-12-28 | Stop reason: HOSPADM

## 2023-12-27 RX ORDER — GLUCAGON 1 MG
1 KIT INJECTION
Status: DISCONTINUED | OUTPATIENT
Start: 2023-12-27 | End: 2023-12-28 | Stop reason: HOSPADM

## 2023-12-27 RX ORDER — HYDROCODONE BITARTRATE AND ACETAMINOPHEN 500; 5 MG/1; MG/1
TABLET ORAL
Status: DISCONTINUED | OUTPATIENT
Start: 2023-12-27 | End: 2023-12-28 | Stop reason: HOSPADM

## 2023-12-27 RX ORDER — FENTANYL CITRATE 50 UG/ML
50 INJECTION, SOLUTION INTRAMUSCULAR; INTRAVENOUS
Status: COMPLETED | OUTPATIENT
Start: 2023-12-27 | End: 2023-12-27

## 2023-12-27 RX ORDER — CIPROFLOXACIN 2 MG/ML
400 INJECTION, SOLUTION INTRAVENOUS
Status: DISCONTINUED | OUTPATIENT
Start: 2023-12-27 | End: 2023-12-28 | Stop reason: HOSPADM

## 2023-12-27 RX ADMIN — OXYCODONE HYDROCHLORIDE 5 MG: 5 TABLET ORAL at 10:12

## 2023-12-27 RX ADMIN — PANTOPRAZOLE SODIUM 40 MG: 40 INJECTION, POWDER, FOR SOLUTION INTRAVENOUS at 09:12

## 2023-12-27 RX ADMIN — RIFAXIMIN 550 MG: 550 TABLET ORAL at 10:12

## 2023-12-27 RX ADMIN — FENTANYL CITRATE 50 MCG: 50 INJECTION INTRAMUSCULAR; INTRAVENOUS at 09:12

## 2023-12-27 RX ADMIN — MORPHINE SULFATE 2 MG: 4 INJECTION INTRAVENOUS at 10:12

## 2023-12-27 RX ADMIN — FENTANYL CITRATE 25 MCG: 50 INJECTION INTRAMUSCULAR; INTRAVENOUS at 05:12

## 2023-12-27 RX ADMIN — LACTULOSE 15 G: 20 SOLUTION ORAL at 09:12

## 2023-12-27 RX ADMIN — OXYCODONE HYDROCHLORIDE 5 MG: 5 TABLET ORAL at 04:12

## 2023-12-27 RX ADMIN — SODIUM CHLORIDE 1000 ML: 9 INJECTION, SOLUTION INTRAVENOUS at 04:12

## 2023-12-27 NOTE — PROGRESS NOTES
Contacted about patient while she was in the ER because she was found to have a Hgb of 4.5, and was having chest pain. She had no overt bleeding. [place] nonrefundable plane ticket to return in the morning. Therefore, she is asking only for transfusion and no endoscopy at this time. Apparently, the anemia is an ongoing problem. She reports having 4 units of blood and an EGD last month without an identifiable bleeding source. Colonoscopy is planned. She has a history cirrhosis. She has been in contact with her PCP and reaching out to her Hepatologist. HM instructed to let us know if the patient decides to stay and we can offer colonoscopy this admit.   Alfredo Ballard PA-C.

## 2023-12-27 NOTE — ED PROVIDER NOTES
"SCRIBE #1 NOTE: I, Cammy Leija, am scribing for, and in the presence of, Gilberto Henley Jr., MD. I have scribed the HPI, ROS, and PEx.     SCRIBE #2 NOTE: I, Taylor Avery, am scribing for, and in the presence of,  Flako Valenzuela MD. I have scribed the remaining portions of the note not scribed by Scribe #1.      History     Chief Complaint   Patient presents with    Chest Pain     "Rapid heart beat." Hx of liver disease and anemia with frequent blood transfusions.      Review of patient's allergies indicates:   Allergen Reactions    Flexeril [cyclobenzaprine] Other (See Comments)     "I was knocked out and intubated for 4 days because it did not pass through my liver."    Vitamin k2 Swelling    Ace inhibitors Swelling     angioedema    Decadron [dexamethasone] Other (See Comments)     Lose control of bowels    Pcn [penicillins] Hives         History of Present Illness     HPI    12/27/2023, 3:49 AM  History obtained from the patient      History of Present Illness: Avril Mallory is a 43 y.o. female patient with a PMHx of HTN and pancreatitis who presents to the Emergency Department for evaluation of CP which onset today. The patient reports a Hx of anemia, noting that she had 4 units of blood transfused during her last ER visit 4 months ago. She contacted her PCP, who advised her to come to the ER for an evaluation and for a possible transfusion. Symptoms are constant and moderate in severity. No mitigating or exacerbating factors reported. Associated sxs include palpitations and lightheadedness. Patient denies any fever, chills, SOB, hematochezia, hematuria, N/V, and all other sxs at this time. No prior Tx reported. No further complaints or concerns at this time.  Patient with history of iron-deficiency anemia.  Patient lives in [facility].  History of alcohol cirrhosis.  Patient denies any blood in his stool.  No black stools      Arrival mode: Personal vehicle    PCP: Melisa Yang, " MD        Past Medical History:  Past Medical History:   Diagnosis Date    Hypertension     Pancreatitis     Pancreatitis        Past Surgical History:  Past Surgical History:   Procedure Laterality Date    GASTRIC BYPASS           Family History:  Family History   Problem Relation Age of Onset    Cancer Mother     No Known Problems Father        Social History:  Social History     Tobacco Use    Smoking status: Never    Smokeless tobacco: Never   Substance and Sexual Activity    Alcohol use: Yes     Comment: ocassional    Drug use: No    Sexual activity: Not on file        Review of Systems     Review of Systems   Constitutional:  Negative for chills and fever.   HENT:  Negative for sore throat.    Respiratory:  Negative for shortness of breath.    Cardiovascular:  Positive for chest pain and palpitations.   Gastrointestinal:  Negative for blood in stool, nausea and vomiting.   Genitourinary:  Negative for dysuria and hematuria.   Musculoskeletal:  Negative for back pain.   Skin:  Negative for rash.   Neurological:  Positive for light-headedness. Negative for weakness.   Hematological:  Does not bruise/bleed easily.   All other systems reviewed and are negative.     Physical Exam     Initial Vitals [12/27/23 0336]   BP Pulse Resp Temp SpO2   (!) 81/49 98 18 99.3 °F (37.4 °C) 100 %      MAP       --          Physical Exam  Nursing Notes and Vital Signs Reviewed.  Constitutional: Patient is in no acute distress. Well-developed and well-nourished.  Head: Atraumatic. Normocephalic.  Eyes:  EOM intact.  No scleral icterus.  ENT: Mucous membranes are moist.  Nares clear   Neck:  Full ROM. No JVD.  Cardiovascular: Regular rate. Regular rhythm No murmurs, rubs, or gallops. Distal pulses are 2+ and symmetric  Pulmonary/Chest: No respiratory distress. Clear to auscultation bilaterally. No wheezing or rales.  Equal chest wall rise bilaterally  Abdominal: Soft and non-distended.  There is no tenderness.  No rebound, guarding,  or rigidity. Good bowel sounds.  Genitourinary: No CVA tenderness.  No suprapubic tenderness  Musculoskeletal: Moves all extremities. No obvious deformities.  5 x 5 strength in all extremities   Skin: Warm and dry.  Neurological:  Alert, awake, and appropriate.  Normal speech.  No acute focal neurological deficits are appreciated.  Two through 12 intact bilaterally.  Psychiatric: Normal affect. Good eye contact. Appropriate in content.     ED Course   Critical Care    Date/Time: 12/27/2023 4:35 AM    Performed by: Gilberto Henley Jr., MD  Authorized by: Gilberto Henley Jr., MD  Direct patient critical care time: 14 minutes  Additional history critical care time: 10 minutes  Ordering / reviewing critical care time: 8 minutes  Documentation critical care time: 9 minutes  Consult with family critical care time: 4 minutes  Total critical care time (exclusive of procedural time) : 45 minutes  Critical care time was exclusive of separately billable procedures and treating other patients and teaching time.  Critical care was necessary to treat or prevent imminent or life-threatening deterioration of the following conditions: circulatory failure.  Critical care was time spent personally by me on the following activities: development of treatment plan with patient or surrogate, interpretation of cardiac output measurements, evaluation of patient's response to treatment, examination of patient, ordering and performing treatments and interventions, obtaining history from patient or surrogate, ordering and review of laboratory studies, ordering and review of radiographic studies, pulse oximetry, re-evaluation of patient's condition and review of old charts.      External Jugular IV    Date/Time: 12/27/2023 10:25 AM    Performed by: Flako Valenzuela MD  Authorized by: Flako Valenzuela MD  Consent Done: Yes  Consent: Verbal consent obtained.  Consent given by: patient  Patient understanding: patient states understanding of the  "procedure being performed  Patient consent: the patient's understanding of the procedure matches consent given  Relevant documents: relevant documents present and verified  Required items: required blood products, implants, devices, and special equipment available  Patient identity confirmed: verbally with patient  Time out: Immediately prior to procedure a "time out" was called to verify the correct patient, procedure, equipment, support staff and site/side marked as required.  Location (Ext Jugular): Left.  Area Prepped With: Chlorohexidine.  Catheter Size: 18 ga.  Catheter Type: Jelco.  Number of attempts: 1  Fixation/Dressing: Tegaderm.  Patient tolerance: Patient tolerated the procedure well with no immediate complications        ED Vital Signs:  Vitals:    12/27/23 0805 12/27/23 0820 12/27/23 0905 12/27/23 0924   BP: (!) 101/57 (!) 109/59 (!) 102/52    Pulse: 103 104 105    Resp: 18 18 18 (!) 22   Temp: 99.8 °F (37.7 °C) 98.6 °F (37 °C) 99.1 °F (37.3 °C)    TempSrc: Oral Oral Oral    SpO2: 100% 100% 100%    Weight:       Height:        12/27/23 1120 12/27/23 1135 12/27/23 1150 12/27/23 1205   BP: 105/61 105/62 104/64 (!) 99/56   Pulse: 105 103 104 105   Resp: 18 18 18 18   Temp: 99.1 °F (37.3 °C) 98.9 °F (37.2 °C) 99.1 °F (37.3 °C) 99.6 °F (37.6 °C)   TempSrc: Oral Oral Oral Oral   SpO2: 100% 100% 100% 99%   Weight:       Height:        12/27/23 1250 12/27/23 1442 12/27/23 1544 12/27/23 1545   BP: (!) 98/59 97/62 103/61    Pulse: 102 101 101 100   Resp: 18 (!) 74 18    Temp: 99.3 °F (37.4 °C) 99.3 °F (37.4 °C) 99.7 °F (37.6 °C)    TempSrc: Oral Oral Oral    SpO2: 100% 100% 99%    Weight:       Height:        12/27/23 1614 12/27/23 1615 12/27/23 1630   BP:  109/79 102/61   Pulse:  98 96   Resp: 16 18 18   Temp:  99.6 °F (37.6 °C) 99.3 °F (37.4 °C)   TempSrc:  Oral Oral   SpO2:  98% 99%   Weight:  65.7 kg (144 lb 13.5 oz)    Height:  5' 4" (1.626 m)        Abnormal Lab Results:  Labs Reviewed   CBC W/ AUTO " DIFFERENTIAL - Abnormal; Notable for the following components:       Result Value    RBC 2.12 (*)     Hemoglobin 4.5 (*)     Hematocrit 15.3 (*)     MCV 72 (*)     MCH 21.2 (*)     MCHC 29.4 (*)     RDW 16.5 (*)     Platelets 68 (*)     Immature Granulocytes 0.7 (*)     Immature Grans (Abs) 0.05 (*)     Lymph # 0.9 (*)     nRBC 2 (*)     Gran % 79.3 (*)     Lymph % 11.9 (*)     All other components within normal limits    Narrative:     H&H critical result(s) called and verbal readback obtained from mike arcos rn by KAT5 12/27/2023 04:21   COMPREHENSIVE METABOLIC PANEL - Abnormal; Notable for the following components:    CO2 21 (*)     Glucose 121 (*)     Calcium 7.6 (*)     Total Protein 5.6 (*)     Albumin 2.7 (*)     Total Bilirubin 2.4 (*)     Alkaline Phosphatase 160 (*)     AST 82 (*)     All other components within normal limits   B-TYPE NATRIURETIC PEPTIDE - Abnormal; Notable for the following components:     (*)     All other components within normal limits   PROTIME-INR - Abnormal; Notable for the following components:    Prothrombin Time 18.8 (*)     INR 1.9 (*)     All other components within normal limits   OCCULT BLOOD X 1, STOOL - Abnormal; Notable for the following components:    Occult Blood Positive (*)     All other components within normal limits   HIV 1 / 2 ANTIBODY    Narrative:     Release to patient->Immediate   HEPATITIS C ANTIBODY    Narrative:     Release to patient->Immediate   HEP C VIRUS HOLD SPECIMEN    Narrative:     Release to patient->Immediate   TROPONIN I   TROPONIN I   APTT   LIPASE   MAGNESIUM   PHOSPHORUS   HEMOGLOBIN A1C   IRON AND TIBC   FERRITIN   TYPE & SCREEN        All Lab Results:  Results for orders placed or performed during the hospital encounter of 12/27/23   HIV 1/2 Ag/Ab (4th Gen)   Result Value Ref Range    HIV 1/2 Ag/Ab Negative Negative   Hepatitis C Antibody   Result Value Ref Range    Hepatitis C Ab Negative Negative   HCV Virus Hold Specimen    Result Value Ref Range    HEP C Virus Hold Specimen Hold for HCV sendout    CBC auto differential   Result Value Ref Range    WBC 7.46 3.90 - 12.70 K/uL    RBC 2.12 (L) 4.00 - 5.40 M/uL    Hemoglobin 4.5 (LL) 12.0 - 16.0 g/dL    Hematocrit 15.3 (LL) 37.0 - 48.5 %    MCV 72 (L) 82 - 98 fL    MCH 21.2 (L) 27.0 - 31.0 pg    MCHC 29.4 (L) 32.0 - 36.0 g/dL    RDW 16.5 (H) 11.5 - 14.5 %    Platelets 68 (L) 150 - 450 K/uL    MPV SEE COMMENT 9.2 - 12.9 fL    Immature Granulocytes 0.7 (H) 0.0 - 0.5 %    Gran # (ANC) 5.9 1.8 - 7.7 K/uL    Immature Grans (Abs) 0.05 (H) 0.00 - 0.04 K/uL    Lymph # 0.9 (L) 1.0 - 4.8 K/uL    Mono # 0.6 0.3 - 1.0 K/uL    Eos # 0.0 0.0 - 0.5 K/uL    Baso # 0.02 0.00 - 0.20 K/uL    nRBC 2 (A) 0 /100 WBC    Gran % 79.3 (H) 38.0 - 73.0 %    Lymph % 11.9 (L) 18.0 - 48.0 %    Mono % 7.4 4.0 - 15.0 %    Eosinophil % 0.4 0.0 - 8.0 %    Basophil % 0.3 0.0 - 1.9 %    Aniso Slight     Poly Occasional     Hypo Marked     Ovalocytes Occasional     Target Cells Occasional     Differential Method Automated    Comprehensive metabolic panel   Result Value Ref Range    Sodium 138 136 - 145 mmol/L    Potassium 3.6 3.5 - 5.1 mmol/L    Chloride 107 95 - 110 mmol/L    CO2 21 (L) 23 - 29 mmol/L    Glucose 121 (H) 70 - 110 mg/dL    BUN 17 6 - 20 mg/dL    Creatinine 1.1 0.5 - 1.4 mg/dL    Calcium 7.6 (L) 8.7 - 10.5 mg/dL    Total Protein 5.6 (L) 6.0 - 8.4 g/dL    Albumin 2.7 (L) 3.5 - 5.2 g/dL    Total Bilirubin 2.4 (H) 0.1 - 1.0 mg/dL    Alkaline Phosphatase 160 (H) 55 - 135 U/L    AST 82 (H) 10 - 40 U/L    ALT 17 10 - 44 U/L    eGFR >60 >60 mL/min/1.73 m^2    Anion Gap 10 8 - 16 mmol/L   Troponin I #1   Result Value Ref Range    Troponin I <0.006 0.000 - 0.026 ng/mL   Troponin I #2   Result Value Ref Range    Troponin I <0.006 0.000 - 0.026 ng/mL   BNP   Result Value Ref Range     (H) 0 - 99 pg/mL   APTT   Result Value Ref Range    aPTT 30.2 21.0 - 32.0 sec   Protime-INR   Result Value Ref Range    Prothrombin  Time 18.8 (H) 9.0 - 12.5 sec    INR 1.9 (H) 0.8 - 1.2   Lipase   Result Value Ref Range    Lipase 11 4 - 60 U/L   Occult blood x 1, stool    Specimen: Stool   Result Value Ref Range    Occult Blood Positive (A) Negative   Type & Screen   Result Value Ref Range    Group & Rh O POS     Indirect P[place] NEG     Specimen Outdate 12/30/2023 23:59    Prepare RBC 3 Units; symptomatic anemia   Result Value Ref Range    UNIT NUMBER [id number]     Product Code R5845L96     DISPENSE STATUS ISSUED     CODING SYSTEM [id number]     Unit Blood Type Code 5100     Unit Blood Type O POS     Unit Expiration [id number]     CROSSMATCH INTERPRETATION Compatible     UNIT NUMBER [id number]     Product Code T1183M18     DISPENSE STATUS ISSUED     CODING SYSTEM [id number]     Unit Blood Type Code 5100     Unit Blood Type O POS     Unit Expiration [id number]     CROSSMATCH INTERPRETATION Compatible     UNIT NUMBER [id number]     Product Code I6479U95     DISPENSE STATUS ISSUED     CODING SYSTEM [id number]     Unit Blood Type Code 5100     Unit Blood Type O POS     Unit Expiration [id number]     CROSSMATCH INTERPRETATION Compatible          Imaging Results:  Imaging Results              CT Abdomen Pelvis  Without Contrast (Final result)  Result time 12/27/23 11:18:23   Procedure changed from CT Abdomen Pelvis With IV Contrast NO Oral Contrast     Final result by Danica [facility]In[facility]), MD (12/27/23 11:18:23)                   Impression:      Suspected cirrhosis of the liver with mild ascites.  Portal hypertension is present with prominent periumbilical vein and splenomegaly.    There is circumferential wall thickening involving the right colon and cecum which could be related to hepatic colopathy.  However, infectious or inflammatory colitis cannot be excluded.  Correlate clinically.    All CT scans at this facility use dose modulation, iterative reconstructions, and/or weight base dosing when appropriate to reduce radiation dose to  as low as reasonably achievable      Electronically signed by: Danica Kevin MD  Date:    12/27/2023  Time:    11:18               Narrative:    EXAMINATION:  CT ABDOMEN PELVIS WITHOUT CONTRAST    CLINICAL HISTORY:  Epigastric pain;    TECHNIQUE:  Noncontrast images were obtained.    COMPARISON:  CT abdomen pelvis, 04/14/2018.    FINDINGS:  Lung bases are clear    Interval development of a heterogeneous appearance involving the liver which could be related to cirrhosis.  There is mild ascites present there is evidence of portal hypertension with a large periumbilical vein.  There is interval development of moderate splenomegaly as well.    The gallbladder is unremarkable.  There is no bile duct dilatation.    The kidneys are normal.    The aorta and inferior vena cava are unremarkable.    Prior gastric bypass surgery.  There is a apparent thickening involving the wall of the cecum and right colon which could be related to hepatic colopathy.  Normal appendix.    Bladder is normal. No abnormal masses or fluid collections in the pelvis.    Skeletal structures are intact.  No acute skeletal findings.                                       X-Ray Chest AP Portable (Final result)  Result time 12/27/23 08:34:05      Final result by Rickie Eden MD (12/27/23 08:34:05)                   Impression:      No acute finding in the chest.      Electronically signed by: Rickie Eden  Date:    12/27/2023  Time:    08:34               Narrative:    EXAMINATION:  XR CHEST AP PORTABLE    CLINICAL HISTORY:  Chest Pain;    FINDINGS:  Comparison: January 2011.    Mediastinal silhouette is within normal limits.  The lungs are clear.  No pneumothorax or pleural effusion.  No acute osseous finding.                        Wet Read by Gilberto Henley Jr., MD (12/27/23 05:31:16, [facility] - Emergency Dept., Emergency Medicine)    No acute findings                                     The EKG was ordered, reviewed, and independently  interpreted by the ED provider.  Interpretation time: 03:32  Rate: 98 BPM  Rhythm: normal sinus rhythm  Interpretation: Minimal voltage criteria for LVH, may be normal variant (R in aVL). Inferior infarct, age undetermined. Anterolateral infarct, age undetermined. No STEMI.           The Emergency Provider reviewed the vital signs and test results, which are outlined above.     ED Discussion     6:00 AM: Dr. Henley transfers care of patient to Dr. Valenzuela pending lab results.    11:29 AM: Discussed pt's case with Dr. Fields (Gastroenterology) who recommends that patient be resuscitated and to look to see when last EGD was.     11:32 AM: Discussed case with Dr. Wiley (Hospital Medicine). Dr. Wiley agrees with current care and management of pt and accepts admission.   Admitting Service: Hospital Medicine  Admitting Physician: Dr. Wiley  Admit to: Tele     11:34 AM: Re-evaluated pt. I have discussed test results, shared treatment plan, and the need for admission with patient and family at bedside. Pt and family express understanding at this time and agree with all information. All questions answered. Pt and family have no further questions or concerns at this time. Pt is ready for admit.       ED Course as of 12/27/23 1736   Wed Dec 27, 2023   0915 I started an 18 Ga IV in the left AC using ultrasound guidance due to difficult access.  [BA]   1026 EJ placed in left neck.  [BA]   1733 Rectal exam performed with female chaperone. There is light brown loose stool in the rectal vault. No melena or hematochezia.  [BA]      ED Course User Index  [BA] Flako Valenzuela MD     Medical Decision Making  Differential diagnosis:  Anemia, symptomatic anemia, chest pain, ASCVD, cardiac ischemia, STEMI, NSTEMI    Amount and/or Complexity of Data Reviewed  Independent Historian: parent  External Data Reviewed: labs and notes.  Labs: ordered. Decision-making details documented in ED Course.  Radiology: ordered and independent  interpretation performed. Decision-making details documented in ED Course.  ECG/medicine tests: ordered and independent interpretation performed. Decision-making details documented in ED Course.    Risk  OTC drugs.  Prescription drug management.  Drug therapy requiring intensive monitoring for toxicity.  Decision regarding hospitalization.    Critical Care  Total time providing critical care: 45 minutes                ED Medication(s):  Medications   0.9%  NaCl infusion (for blood administration) (has no administration in time range)   sodium chloride 0.9% flush 10 mL (has no administration in time range)   ondansetron injection 4 mg (has no administration in time range)   naloxone 0.4 mg/mL injection 0.02 mg (has no administration in time range)   glucose chewable tablet 16 g (has no administration in time range)   glucose chewable tablet 24 g (has no administration in time range)   glucagon (human recombinant) injection 1 mg (has no administration in time range)   dextrose 10% bolus 125 mL 125 mL (has no administration in time range)   dextrose 10% bolus 250 mL 250 mL (has no administration in time range)   pantoprazole injection 40 mg (40 mg Intravenous Not Given 12/27/23 1145)   ciprofloxacin (CIPRO)400mg/200ml D5W IVPB 400 mg (400 mg Intravenous Not Given 12/27/23 1245)   oxyCODONE immediate release tablet 5 mg (5 mg Oral Given 12/27/23 1614)   sodium chloride 0.9% bolus 1,000 mL 1,000 mL (0 mLs Intravenous Stopped 12/27/23 0536)   fentaNYL 50 mcg/mL injection 25 mcg (25 mcg Intravenous Given 12/27/23 0515)   fentaNYL 50 mcg/mL injection 50 mcg (50 mcg Intravenous Given 12/27/23 0924)   morphine injection 2 mg (2 mg Intravenous Given 12/27/23 1032)       Current Discharge Medication List                  Scribe Attestation:   Scribe #1: I performed the above scribed service and the documentation accurately describes the services I performed. I attest to the accuracy of the note.     Attending:   Physician  Attestation Statement for Scribe #1: I, Gilberto Henley Jr., MD, personally performed the services described in this documentation, as scribed by Cammy Leija, in my presence, and it is both accurate and complete.       Scribe Attestation:   Scribe #2: I performed the above scribed service and the documentation accurately describes the services I performed. I attest to the accuracy of the note.    Attending Attestation:           Physician Attestation for Scribe:    Physician Attestation Statement for Scribe #2: I, Flako Valenzuela MD, reviewed documentation, as scribed by Taylor Avery in my presence, and it is both accurate and complete. I also acknowledge and confirm the content of the note done by Scribe #1.           Clinical Impression       ICD-10-CM ICD-9-CM   1. Iron deficiency anemia, unspecified iron deficiency anemia type  D50.9 280.9   2. Chest pain  R07.9 786.50   3. Hepatic cirrhosis, unspecified hepatic cirrhosis type, unspecified whether ascites present  K74.60 571.5   4. Chest pain, unspecified type  R07.9 786.50       Disposition:   Disposition: Admitted  Condition: Flako Olivares MD  12/27/23 1732

## 2023-12-27 NOTE — SUBJECTIVE & OBJECTIVE
"Past Medical History:   Diagnosis Date    Hypertension     Pancreatitis     Pancreatitis        Past Surgical History:   Procedure Laterality Date    GASTRIC BYPASS         Review of patient's allergies indicates:   Allergen Reactions    Flexeril [cyclobenzaprine] Other (See Comments)     "I was knocked out and intubated for 4 days because it did not pass through my liver."    Vitamin k2 Swelling    Ace inhibitors Swelling     angioedema    Decadron [dexamethasone] Other (See Comments)     Lose control of bowels    Pcn [penicillins] Hives       No current facility-administered medications on file prior to encounter.     Current Outpatient Medications on File Prior to Encounter   Medication Sig    metoprolol tartrate (LOPRESSOR) 50 MG tablet Take 50 mg by mouth once daily.    naproxen (NAPROSYN) 500 MG tablet Take 1 tablet (500 mg total) by mouth 2 (two) times daily with meals.    ondansetron (ZOFRAN) 4 MG tablet Take 1 tablet (4 mg total) by mouth every 8 (eight) hours as needed.     Family History       Problem Relation (Age of Onset)    Cancer Mother    No Known Problems Father          Tobacco Use    Smoking status: Never    Smokeless tobacco: Never   Substance and Sexual Activity    Alcohol use: Yes     Comment: ocassional    Drug use: No    Sexual activity: Not on file     Review of Systems   Constitutional:  Positive for activity change.   Respiratory:  Positive for shortness of breath.    Cardiovascular:  Positive for chest pain and palpitations.   Gastrointestinal:  Positive for abdominal pain and blood in stool. Negative for diarrhea and vomiting.   All other systems reviewed and are negative.    Objective:     Vital Signs (Most Recent):  Temp: 99.3 °F (37.4 °C) (12/27/23 1630)  Pulse: 96 (12/27/23 1630)  Resp: 18 (12/27/23 1630)  BP: 102/61 (12/27/23 1630)  SpO2: 99 % (12/27/23 1630) Vital Signs (24h Range):  Temp:  [98.6 °F (37 °C)-99.8 °F (37.7 °C)] 99.3 °F (37.4 °C)  Pulse:  [] 96  Resp:  [16-74] " 18  SpO2:  [98 %-100 %] 99 %  BP: ()/(45-79) 102/61     Weight: 65.7 kg (144 lb 13.5 oz)  Body mass index is 24.86 kg/m².     Physical Exam  Constitutional:       Appearance: She is ill-appearing.   HENT:      Head: Normocephalic and atraumatic.   Eyes:      General: Scleral icterus present.   Cardiovascular:      Rate and Rhythm: Normal rate and regular rhythm.      Heart sounds: No murmur heard.  Pulmonary:      Effort: Pulmonary effort is normal. No respiratory distress.      Breath sounds: Normal breath sounds. No wheezing.   Abdominal:      General: Bowel sounds are normal. There is no distension.      Palpations: Abdomen is soft.      Tenderness: There is no abdominal tenderness.   Musculoskeletal:         General: No swelling.   Skin:     General: Skin is warm and dry.   Neurological:      Mental Status: She is alert and oriented to person, place, and time. Mental status is at baseline.                Significant Labs: All pertinent labs within the past 24 hours have been reviewed.  CBC:   Recent Labs   Lab 12/27/23  0407   WBC 7.46   HGB 4.5*   HCT 15.3*   PLT 68*     CMP:   Recent Labs   Lab 12/27/23  0407      K 3.6      CO2 21*   *   BUN 17   CREATININE 1.1   CALCIUM 7.6*   PROT 5.6*   ALBUMIN 2.7*   BILITOT 2.4*   ALKPHOS 160*   AST 82*   ALT 17   ANIONGAP 10       Significant Imaging: I have reviewed all pertinent imaging results/findings within the past 24 hours.

## 2023-12-27 NOTE — ADMISSIONCARE
AdmissionCare    Guideline: Gastrointestinal Bleeding (Upper) - INPT, Inpatient    Based on the indications selected for the patient, the bed status of Admit to Inpatient was determined to be MET    The following indications were selected as present at the time of evaluation of the patient:      Hemodynamic instability   -    - Coagulopathy that is not quickly reversible (eg, advanced liver disease, irreversible anticoagulation therapy, thrombocytopenia)    AdmissionCare documentation entered by: Shweta Leggett    [facility] [employer], 27th edition, Copyright © 2023 [facility] [employer], [facility] All Rights Reserved.  [id number]

## 2023-12-27 NOTE — HPI
The patient is a 44 yo female with past medical history of anemia, ascites, cirrhosis, espohageal varices s/p banding and hypertension who presented to the ED with palpitations and shortness of breath. She reports she could barely make it from the bedroom to the bathroom. She is visiting from [place], [place]. She had EGD last month (no varices per patient). Colonoscopy was recommended but she was unable to tolerate prep. Workup in ED revealed hemoglobin of 4.5. She reports she has a flight to catch in the morning and only wants to receive blood transfusion. She reports she received 4 units of blood last month. She has already called her hepatology team in [place]. Hospital medicine and GI consulted.

## 2023-12-27 NOTE — ASSESSMENT & PLAN NOTE
-has hepatologist in [place]  MELD-Na score calculated; MELD 3.0: 20 at 12/27/2023  4:16 AM  MELD-Na: 18 at 12/27/2023  4:16 AM  Calculated from:  Serum Creatinine: 1.1 mg/dL at 12/27/2023  4:07 AM  Serum Sodium: 138 mmol/L (Using max of 137 mmol/L) at 12/27/2023  4:07 AM  Total Bilirubin: 2.4 mg/dL at 12/27/2023  4:07 AM  Serum Albumin: 2.7 g/dL at 12/27/2023  4:07 AM  INR(ratio): 1.9 at 12/27/2023  4:16 AM  Age at listing (hypothetical): 43 years  Sex: Female at 12/27/2023  4:16 AM      Continue chronic meds. Etiology likely ETOH. Will avoid any hepatotoxic meds, and monitor CBC/CMP/INR for synthetic function.

## 2023-12-27 NOTE — H&P
"  [facility] - Telemetry ([facility] Medicine  History & Physical    Patient Name: Avril Mallory  MRN: [id number]  Patient Class: IP- Inpatient  Admission Date: 12/27/2023  Attending Physician: Keisha Wiley MD   Primary Care Provider: Melisa Yang MD         Patient information was obtained from patient, past medical records, and ER records.     Subjective:     Principal Problem:Severe anemia    Chief Complaint:   Chief Complaint   Patient presents with    Chest Pain     "Rapid heart beat." Hx of liver disease and anemia with frequent blood transfusions.         HPI: The patient is a 42 yo female with past medical history of anemia, ascites, cirrhosis, espohageal varices s/p banding and hypertension who presented to the ED with palpitations and shortness of breath. She reports she could barely make it from the bedroom to the bathroom. She is visiting from [place], [place]. She had EGD last month (no varices per patient). Colonoscopy was recommended but she was unable to tolerate prep. Workup in ED revealed hemoglobin of 4.5. She reports she has a flight to catch in the morning and only wants to receive blood transfusion. She reports she received 4 units of blood last month. She has already called her hepatology team in [place]. Hospital medicine and GI consulted.     Past Medical History:   Diagnosis Date    Hypertension     Pancreatitis     Pancreatitis        Past Surgical History:   Procedure Laterality Date    GASTRIC BYPASS         Review of patient's allergies indicates:   Allergen Reactions    Flexeril [cyclobenzaprine] Other (See Comments)     "I was knocked out and intubated for 4 days because it did not pass through my liver."    Vitamin k2 Swelling    Ace inhibitors Swelling     angioedema    Decadron [dexamethasone] Other (See Comments)     Lose control of bowels    Pcn [penicillins] Hives       No current facility-administered medications on file prior to encounter. "     Current Outpatient Medications on File Prior to Encounter   Medication Sig    metoprolol tartrate (LOPRESSOR) 50 MG tablet Take 50 mg by mouth once daily.    naproxen (NAPROSYN) 500 MG tablet Take 1 tablet (500 mg total) by mouth 2 (two) times daily with meals.    ondansetron (ZOFRAN) 4 MG tablet Take 1 tablet (4 mg total) by mouth every 8 (eight) hours as needed.     Family History       Problem Relation (Age of Onset)    Cancer Mother    No Known Problems Father          Tobacco Use    Smoking status: Never    Smokeless tobacco: Never   Substance and Sexual Activity    Alcohol use: Yes     Comment: ocassional    Drug use: No    Sexual activity: Not on file     Review of Systems   Constitutional:  Positive for activity change.   Respiratory:  Positive for shortness of breath.    Cardiovascular:  Positive for chest pain and palpitations.   Gastrointestinal:  Positive for abdominal pain and blood in stool. Negative for diarrhea and vomiting.   All other systems reviewed and are negative.    Objective:     Vital Signs (Most Recent):  Temp: 99.3 °F (37.4 °C) (12/27/23 1630)  Pulse: 96 (12/27/23 1630)  Resp: 18 (12/27/23 1630)  BP: 102/61 (12/27/23 1630)  SpO2: 99 % (12/27/23 1630) Vital Signs (24h Range):  Temp:  [98.6 °F (37 °C)-99.8 °F (37.7 °C)] 99.3 °F (37.4 °C)  Pulse:  [] 96  Resp:  [16-74] 18  SpO2:  [98 %-100 %] 99 %  BP: ()/(45-79) 102/61     Weight: 65.7 kg (144 lb 13.5 oz)  Body mass index is 24.86 kg/m².     Physical Exam  Constitutional:       Appearance: She is ill-appearing.   HENT:      Head: Normocephalic and atraumatic.   Eyes:      General: Scleral icterus present.   Cardiovascular:      Rate and Rhythm: Normal rate and regular rhythm.      Heart sounds: No murmur heard.  Pulmonary:      Effort: Pulmonary effort is normal. No respiratory distress.      Breath sounds: Normal breath sounds. No wheezing.   Abdominal:      General: Bowel sounds are normal. There is no distension.       Palpations: Abdomen is soft.      Tenderness: There is no abdominal tenderness.   Musculoskeletal:         General: No swelling.   Skin:     General: Skin is warm and dry.   Neurological:      Mental Status: She is alert and oriented to person, place, and time. Mental status is at baseline.                Significant Labs: All pertinent labs within the past 24 hours have been reviewed.  CBC:   Recent Labs   Lab 12/27/23  0407   WBC 7.46   HGB 4.5*   HCT 15.3*   PLT 68*     CMP:   Recent Labs   Lab 12/27/23 0407      K 3.6      CO2 21*   *   BUN 17   CREATININE 1.1   CALCIUM 7.6*   PROT 5.6*   ALBUMIN 2.7*   BILITOT 2.4*   ALKPHOS 160*   AST 82*   ALT 17   ANIONGAP 10       Significant Imaging: I have reviewed all pertinent imaging results/findings within the past 24 hours.  Assessment/Plan:     * Severe anemia  -transfuse 3 units of blood  -post transfusion H/H  -plans to follow-up in [place]    Alcoholic cirrhosis of liver with ascites  -has hepatologist in [place]  MELD-Na score calculated; MELD 3.0: 20 at 12/27/2023  4:16 AM  MELD-Na: 18 at 12/27/2023  4:16 AM  Calculated from:  Serum Creatinine: 1.1 mg/dL at 12/27/2023  4:07 AM  Serum Sodium: 138 mmol/L (Using max of 137 mmol/L) at 12/27/2023  4:07 AM  Total Bilirubin: 2.4 mg/dL at 12/27/2023  4:07 AM  Serum Albumin: 2.7 g/dL at 12/27/2023  4:07 AM  INR(ratio): 1.9 at 12/27/2023  4:16 AM  Age at listing (hypothetical): 43 years  Sex: Female at 12/27/2023  4:16 AM      Continue chronic meds. Etiology likely ETOH. Will avoid any hepatotoxic meds, and monitor CBC/CMP/INR for synthetic function.     GI bleed  -patient declined further workup here wants transfusion so she can fly home tomorrow        VTE Risk Mitigation (From admission, onward)           Ordered     IP VTE LOW RISK PATIENT  Once         12/27/23 1138     Place sequential compression device  Until discontinued         12/27/23 1138                               Contacted about  patient while she was in the ER because she was found to have a Hgb of 4.5, and was having chest pain. She had no overt bleeding. [place] is from out of [place] and has a nonrefundable plane ticket to return in the morning. Therefore, she is asking only for transfusion and no endoscopy at this time. Apparently, the anemia is an ongoing problem. She reports having 4 units of blood and an EGD last month without an identifiable bleeding source. Colonoscopy is planned. She has a history cirrhosis. She has been in contact with her PCP and reaching out to her Hepatologist. HM instructed to let us know if the patient decides to stay and we can offer colonoscopy this admit.   Alfredo Ballard PA-C.      Attestation signed by Ana Cristina Bell MD at 12/27/2023  3:45 PM:  I have seen the patient, reviewed the Physician Assistant's assessment. Patient has declined GI consultation and endoscopic evaluation. She is visiting from out of town and has had recent work up for anemia in her state. She requests transfusion only. She has a scheduled flight and plans to return tomorrow and follow up with her physicians upon her return.       Ana Cristina Bell MD  Gastroenterology  [facility]    AdmissionCare    Guideline: Gastrointestinal Bleeding (Upper) - INPT, Inpatient    Based on the indications selected for the patient, the bed status of Admit to Inpatient was determined to be MET    The following indications were selected as present at the time of evaluation of the patient:      Hemodynamic instability   -    - Coagulopathy that is not quickly reversible (eg, advanced liver disease, irreversible anticoagulation therapy, thrombocytopenia)    AdmissionCare documentation entered by: Shweta Leggett    [facility], 27th edition, Copyright © 2023 [facility] All Rights Reserved.  [id number]    Keisha Wiley MD  Department of Hospital Medicine  [facility])

## 2023-12-28 VITALS
TEMPERATURE: 99 F | HEART RATE: 97 BPM | WEIGHT: 170.63 LBS | BODY MASS INDEX: 29.13 KG/M2 | DIASTOLIC BLOOD PRESSURE: 58 MMHG | HEIGHT: 64 IN | SYSTOLIC BLOOD PRESSURE: 98 MMHG | RESPIRATION RATE: 18 BRPM | OXYGEN SATURATION: 96 %

## 2023-12-28 LAB
ALBUMIN SERPL BCP-MCNC: 2.4 G/DL (ref 3.5–5.2)
ALP SERPL-CCNC: 142 U/L (ref 55–135)
ALT SERPL W/O P-5'-P-CCNC: 18 U/L (ref 10–44)
ANION GAP SERPL CALC-SCNC: 8 MMOL/L (ref 8–16)
ANISOCYTOSIS BLD QL SMEAR: ABNORMAL
ANISOCYTOSIS BLD QL SMEAR: ABNORMAL
AST SERPL-CCNC: 91 U/L (ref 10–40)
BASOPHILS # BLD AUTO: 0.05 K/UL (ref 0–0.2)
BASOPHILS # BLD AUTO: 0.05 K/UL (ref 0–0.2)
BASOPHILS NFR BLD: 0.7 % (ref 0–1.9)
BASOPHILS NFR BLD: 0.7 % (ref 0–1.9)
BILIRUB SERPL-MCNC: 3.1 MG/DL (ref 0.1–1)
BUN SERPL-MCNC: 17 MG/DL (ref 6–20)
CALCIUM SERPL-MCNC: 7.4 MG/DL (ref 8.7–10.5)
CHLORIDE SERPL-SCNC: 108 MMOL/L (ref 95–110)
CO2 SERPL-SCNC: 20 MMOL/L (ref 23–29)
CREAT SERPL-MCNC: 0.9 MG/DL (ref 0.5–1.4)
DIFFERENTIAL METHOD BLD: ABNORMAL
DIFFERENTIAL METHOD BLD: ABNORMAL
EOSINOPHIL # BLD AUTO: 0.1 K/UL (ref 0–0.5)
EOSINOPHIL # BLD AUTO: 0.1 K/UL (ref 0–0.5)
EOSINOPHIL NFR BLD: 1.2 % (ref 0–8)
EOSINOPHIL NFR BLD: 1.2 % (ref 0–8)
ERYTHROCYTE [DISTWIDTH] IN BLOOD BY AUTOMATED COUNT: 21.6 % (ref 11.5–14.5)
ERYTHROCYTE [DISTWIDTH] IN BLOOD BY AUTOMATED COUNT: 21.6 % (ref 11.5–14.5)
EST. GFR  (NO RACE VARIABLE): >60 ML/MIN/1.73 M^2
ESTIMATED AVG GLUCOSE: 105 MG/DL (ref 68–131)
GLUCOSE SERPL-MCNC: 96 MG/DL (ref 70–110)
HBA1C MFR BLD: 5.3 % (ref 4–5.6)
HCT VFR BLD AUTO: 20.5 % (ref 37–48.5)
HCT VFR BLD AUTO: 20.5 % (ref 37–48.5)
HGB BLD-MCNC: 6.5 G/DL (ref 12–16)
HGB BLD-MCNC: 6.5 G/DL (ref 12–16)
HYPOCHROMIA BLD QL SMEAR: ABNORMAL
HYPOCHROMIA BLD QL SMEAR: ABNORMAL
IMM GRANULOCYTES # BLD AUTO: 0.09 K/UL (ref 0–0.04)
IMM GRANULOCYTES # BLD AUTO: 0.09 K/UL (ref 0–0.04)
IMM GRANULOCYTES NFR BLD AUTO: 1.3 % (ref 0–0.5)
IMM GRANULOCYTES NFR BLD AUTO: 1.3 % (ref 0–0.5)
IRON SERPL-MCNC: 53 UG/DL (ref 30–160)
LYMPHOCYTES # BLD AUTO: 1.1 K/UL (ref 1–4.8)
LYMPHOCYTES # BLD AUTO: 1.1 K/UL (ref 1–4.8)
LYMPHOCYTES NFR BLD: 15.7 % (ref 18–48)
LYMPHOCYTES NFR BLD: 15.7 % (ref 18–48)
MAGNESIUM SERPL-MCNC: 1.7 MG/DL (ref 1.6–2.6)
MCH RBC QN AUTO: 24.6 PG (ref 27–31)
MCH RBC QN AUTO: 24.6 PG (ref 27–31)
MCHC RBC AUTO-ENTMCNC: 31.7 G/DL (ref 32–36)
MCHC RBC AUTO-ENTMCNC: 31.7 G/DL (ref 32–36)
MCV RBC AUTO: 78 FL (ref 82–98)
MCV RBC AUTO: 78 FL (ref 82–98)
MONOCYTES # BLD AUTO: 1 K/UL (ref 0.3–1)
MONOCYTES # BLD AUTO: 1 K/UL (ref 0.3–1)
MONOCYTES NFR BLD: 15.4 % (ref 4–15)
MONOCYTES NFR BLD: 15.4 % (ref 4–15)
NEUTROPHILS # BLD AUTO: 4.4 K/UL (ref 1.8–7.7)
NEUTROPHILS # BLD AUTO: 4.4 K/UL (ref 1.8–7.7)
NEUTROPHILS NFR BLD: 65.7 % (ref 38–73)
NEUTROPHILS NFR BLD: 65.7 % (ref 38–73)
NRBC BLD-RTO: 6 /100 WBC
NRBC BLD-RTO: 6 /100 WBC
OVALOCYTES BLD QL SMEAR: ABNORMAL
OVALOCYTES BLD QL SMEAR: ABNORMAL
PHOSPHATE SERPL-MCNC: 1.2 MG/DL (ref 2.7–4.5)
PLATELET # BLD AUTO: 68 K/UL (ref 150–450)
PLATELET # BLD AUTO: 68 K/UL (ref 150–450)
PLATELET BLD QL SMEAR: ABNORMAL
PLATELET BLD QL SMEAR: ABNORMAL
PMV BLD AUTO: ABNORMAL FL (ref 9.2–12.9)
PMV BLD AUTO: ABNORMAL FL (ref 9.2–12.9)
POIKILOCYTOSIS BLD QL SMEAR: SLIGHT
POIKILOCYTOSIS BLD QL SMEAR: SLIGHT
POLYCHROMASIA BLD QL SMEAR: ABNORMAL
POLYCHROMASIA BLD QL SMEAR: ABNORMAL
POTASSIUM SERPL-SCNC: 3.2 MMOL/L (ref 3.5–5.1)
PROT SERPL-MCNC: 5 G/DL (ref 6–8.4)
RBC # BLD AUTO: 2.64 M/UL (ref 4–5.4)
RBC # BLD AUTO: 2.64 M/UL (ref 4–5.4)
SATURATED IRON: 17 % (ref 20–50)
SODIUM SERPL-SCNC: 136 MMOL/L (ref 136–145)
TARGETS BLD QL SMEAR: ABNORMAL
TARGETS BLD QL SMEAR: ABNORMAL
TOTAL IRON BINDING CAPACITY: 318 UG/DL (ref 250–450)
TRANSFERRIN SERPL-MCNC: 215 MG/DL (ref 200–375)
WBC # BLD AUTO: 6.76 K/UL (ref 3.9–12.7)
WBC # BLD AUTO: 6.76 K/UL (ref 3.9–12.7)

## 2023-12-28 PROCEDURE — 36415 COLL VENOUS BLD VENIPUNCTURE: CPT | Performed by: NURSE PRACTITIONER

## 2023-12-28 PROCEDURE — 80053 COMPREHEN METABOLIC PANEL: CPT | Performed by: NURSE PRACTITIONER

## 2023-12-28 PROCEDURE — 25000003 PHARM REV CODE 250: Performed by: NURSE PRACTITIONER

## 2023-12-28 PROCEDURE — 83735 ASSAY OF MAGNESIUM: CPT | Performed by: NURSE PRACTITIONER

## 2023-12-28 PROCEDURE — 85025 COMPLETE CBC W/AUTO DIFF WBC: CPT | Performed by: NURSE PRACTITIONER

## 2023-12-28 PROCEDURE — 84100 ASSAY OF PHOSPHORUS: CPT | Performed by: NURSE PRACTITIONER

## 2023-12-28 PROCEDURE — 63600175 PHARM REV CODE 636 W HCPCS: Performed by: NURSE PRACTITIONER

## 2023-12-28 RX ADMIN — CIPROFLOXACIN 400 MG: 2 INJECTION, SOLUTION INTRAVENOUS at 12:12

## 2023-12-28 RX ADMIN — RIFAXIMIN 550 MG: 550 TABLET ORAL at 08:12

## 2023-12-28 RX ADMIN — LACTULOSE 15 G: 20 SOLUTION ORAL at 08:12

## 2023-12-28 NOTE — DISCHARGE SUMMARY
[facility] - Telemetry ([facility] Medicine  Discharge Summary      Patient Name: Avril Mallory  MRN: [id number]  [facility]: [id number]  Patient Class: IP- Inpatient  Admission Date: 12/27/2023  Hospital Length of Stay: 1 days  Discharge Date and Time:  12/28/2023 7:36 AM  Attending Physician: Sierra Kaur MD   Discharging Provider: Sierra Kaur MD  Primary Care Provider: Melisa Yang MD    Primary Care Team: Networked reference to record PCT     HPI:   The patient is a 42 yo female with past medical history of anemia, ascites, cirrhosis, espohageal varices s/p banding and hypertension who presented to the ED with palpitations and shortness of breath. She reports she could barely make it from the bedroom to the bathroom. She is visiting from [place], [place]. She had EGD last month (no varices per patient). Colonoscopy was recommended but she was unable to tolerate prep. Workup in ED revealed hemoglobin of 4.5. She reports she has a flight to catch in the morning and only wants to receive blood transfusion. She reports she received 4 units of blood last month. She has already called her hepatology team in [place]. Hospital medicine and GI consulted.     * No surgery found *      Hospital Course:   12/28  Admitted for severe anemia. Patient refused gastroenterology evaluation. Only requested to receive blood transfusion so she can return [facility] her primary [facility] [place] [facility]. Tolerated blood transfusion with transfusion reaction. Patient continues to experience bloody stool and hematuria.     hypotension persists. Will hyperlipidemia beta blocker on discharge. Hypokalemia (k=3.2) due to gastroenterology losses and borderline low mag (1.7). Instructed on high k diet.    On exam, no acute distress. No respiratory distress. AO3. Anxious.    Patient seen and evaluated by me. Patient was determined to be suitable for discharge. Patient deemed stable for discharge to home.       Goals of Care  Treatment Preferences:  Code Status: Full Code      Consults:   Consults (From admission, onward)          Status Ordering Provider     Inpatient consult to Gastroenterology  Once        Provider:  Alfredo Ballard PA-C Acknowledged NORMAND, APRIL J.            No new Assessment & Plan notes have been filed under this hospital service since the last note was generated.  Service: Hospital Medicine    Final Active Diagnoses:    Diagnosis Date Noted POA    PRINCIPAL PROBLEM:  Severe anemia [D64.9] 12/27/2023 Yes    GI bleed [K92.2] 12/27/2023 Yes    Alcoholic cirrhosis of liver with ascites [K70.31] 09/03/2019 Yes      Problems Resolved During this Admission:       Discharged Condition: stable    Disposition: Home or Self Care    Follow Up:   Follow-up Information       Melisa Yang MD. Schedule an appointment as soon as possible for a visit in 3 day(s).    Specialty: Internal Medicine  Why: hospital follow up  Contact information:  [address] [facility] [address] [phone]                           Patient Instructions:   No discharge procedures on file.    Significant Diagnostic Studies: Labs: CMP   Recent Labs   Lab 12/27/23 0407 12/28/23  0436    136   K 3.6 3.2*    108   CO2 21* 20*   * 96   BUN 17 17   CREATININE 1.1 0.9   CALCIUM 7.6* 7.4*   PROT 5.6* 5.0*   ALBUMIN 2.7* 2.4*   BILITOT 2.4* 3.1*   ALKPHOS 160* 142*   AST 82* 91*   ALT 17 18   ANIONGAP 10 8   , CBC   Recent Labs   Lab 12/27/23 0407 12/27/23  1944 12/28/23  0436   WBC 7.46 8.18 6.76  6.76   HGB 4.5* 6.9* 6.5*  6.5*   HCT 15.3* 21.8* 20.5*  20.5*   PLT 68* 67* 68*  68*   , and All labs within the past 24 hours have been reviewed  Radiology: X-Ray: CXR: portable   CT scan: CT ABDOMEN PELVIS WITHOUT CONTRAST:   Results for orders placed or performed during the hospital encounter of 12/27/23   CT Abdomen Pelvis  Without Contrast    Narrative    EXAMINATION:  CT ABDOMEN PELVIS WITHOUT  CONTRAST    CLINICAL HISTORY:  Epigastric pain;    TECHNIQUE:  Noncontrast images were obtained.    COMPARISON:  CT abdomen pelvis, 04/14/2018.    FINDINGS:  Lung bases are clear    Interval development of a heterogeneous appearance involving the liver which could be related to cirrhosis.  There is mild ascites present there is evidence of portal hypertension with a large periumbilical vein.  There is interval development of moderate splenomegaly as well.    The gallbladder is unremarkable.  There is no bile duct dilatation.    The kidneys are normal.    The aorta and inferior vena cava are unremarkable.    Prior gastric bypass surgery.  There is a apparent thickening involving the wall of the cecum and right colon which could be related to hepatic colopathy.  Normal appendix.    Bladder is normal. No abnormal masses or fluid collections in the pelvis.    Skeletal structures are intact.  No acute skeletal findings.      Impression    Suspected cirrhosis of the liver with mild ascites.  Portal hypertension is present with prominent periumbilical vein and splenomegaly.    There is circumferential wall thickening involving the right colon and cecum which could be related to hepatic colopathy.  However, infectious or inflammatory colitis cannot be excluded.  Correlate clinically.    All CT scans at this facility use dose modulation, iterative reconstructions, and/or weight base dosing when appropriate to reduce radiation dose to as low as reasonably achievable      Electronically signed by: Danica Kevin MD  Date:    12/27/2023  Time:    11:18       Pending Diagnostic Studies:       None           Medications:  Reconciled Home Medications:      Medication List        CONTINUE taking these medications      ondansetron 4 MG tablet  Commonly known as: ZOFRAN  Take 1 tablet (4 mg total) by mouth every 8 (eight) hours as needed.     rifAXImin 200 mg Tab  Commonly known as: XIFAXAN  Take 200 mg by mouth 2 (two) times  daily.            STOP taking these medications      metoprolol tartrate 50 MG tablet  Commonly known as: LOPRESSOR     naproxen 500 MG tablet  Commonly known as: NAPROSYN              Indwelling Lines/Drains at time of discharge:   Lines/Drains/Airways       None                   Time spent on the discharge of patient: 49 minutes         Sierra Kaur MD  Department of Hospital Medicine  '[place] - Telemetry ([facility])

## 2023-12-28 NOTE — NURSING
"Patient called nurse to room to show picture of a bowel movement she just had.  It had bright red blood.  Pt stated "I just wanted to show you this.  I don't know what I'm gonna do because I'm still leaving early this morning because I can't change my flight."  Call team notified  "

## 2023-12-28 NOTE — HOSPITAL COURSE
12/28  Admitted for severe anemia. Patient refused gastroenterology evaluation. Only requested to receive blood transfusion so she can [facility] [place] [facility]. Tolerated blood transfusion with transfusion reaction. Patient continues to experience bloody stool and hematuria.     hypotension persists. Will hyperlipidemia beta blocker on discharge. Hypokalemia (k=3.2) due to gastroenterology losses and borderline low mag (1.7). Instructed on high k diet.    On exam, no acute distress. No respiratory distress. AO3. Anxious.    Patient seen and evaluated by me. Patient was determined to be suitable for discharge. Patient deemed stable for discharge to home.

## 2023-12-28 NOTE — PLAN OF CARE
"A251/A251 CHRISTY Mallory is a 43 y.o.female admitted on 12/27/2023 for Severe anemia   Code Status: Full Code MRN: [id number]   Review of patient's allergies indicates:   Allergen Reactions    Flexeril [cyclobenzaprine] Other (See Comments)     "I was knocked out and intubated for 4 days because it did not pass through my liver."    Vitamin k2 Swelling    Ace inhibitors Swelling     angioedema    Decadron [dexamethasone] Other (See Comments)     Lose control of bowels    Pcn [penicillins] Hives     Past Medical History:   Diagnosis Date    Hypertension     Pancreatitis     Pancreatitis       PRN meds    0.9%  NaCl infusion (for blood administration), , Q24H PRN  dextrose 10%, 12.5 g, PRN  dextrose 10%, 25 g, PRN  glucagon (human recombinant), 1 mg, PRN  glucose, 16 g, PRN  glucose, 24 g, PRN  naloxone, 0.02 mg, PRN  ondansetron, 4 mg, Q8H PRN  oxyCODONE, 5 mg, Q6H PRN  sodium chloride 0.9%, 10 mL, Q12H PRN      AVS Discharge instructions received and reviewed with pt and family at bedside.  Pt voiced understanding and all questions answered to satisfaction.  Stressed importance to making and keeping all follow up appointments.  Medications at bedside and reviewed with pt.  Tele monitor removed and brought to monitor tech.  IV d/c'd with tip intact, pressure dressing applied.  Pt will call when ready to be transported to front of hospital via w/c to be discharged home.      Orientation: oriented x 4  Brii Coma Scale Score: 15     Lead Monitored: Lead II Rhythm: normal sinus rhythm    Cardiac/Telemetry Box Number: [id number]  VTE Required Core Measure: Provider determined low risk VTE Last Bowel Movement: 12/27/23  Diet low fiber/residue     Tod Score: 20  Fall Risk Score: 9  Accucheck []   Freq?      Lines/Drains/Airways       None                      "

## 2023-12-28 NOTE — PROGRESS NOTES
Discharge education and instructions reviewed with patient. Questions answered as of now, LDA's and Telemetry monitor removed per provider order. Patient remained free of falls during shift.  Transport requested, patient discharged with personal belongings via wheelchair.

## 2024-01-03 NOTE — PHYSICIAN QUERY
PT Name: Avril Mallory  MR #: [id number]    DOCUMENTATION CLARIFICATION      CDS: Anayeli Khanna RN         Contact information:[email or web address] or (cell) [phone]    This form is a permanent document in the medical record.      Query Date: January 3, 2024    By submitting this query, we are merely seeking further clarification of documentation. Please utilize your independent clinical judgment when addressing the question(s) below.    The Medical Record contains the following:   Indicators  Supporting Clinical Findings Location in Medical Record   x Anemia documented Severe anemia  -transfuse 3 units of blood  -post transfusion H/H  -plans to follow-up in [place]    Admitted for severe anemia. Patient refused gastroenterology evaluation. Only requested to receive blood transfusion so she can return to her primary team in [place] with [facility]. Tolerated blood transfusion with transfusion reaction. Patient continues to experience bloody stool and hematuria.   H&P 12/27          DCS 12/28   x H&H  12/27/23 04:07 12/27/23 19:44 12/28/23 04:36   Hemoglobin 4.5 (LL) 6.9 (L) 6.5 (L)  6.5 (L)   Hematocrit 15.3 (LL) 21.8 (L) 20.5 (L)  20.5 (L)      Labs    BP                    HR     x Bleeding GI bleed  -patient declined further workup here wants transfusion so she can fly home tomorrow   H&P 12/27    Procedure/Surgery Performed/EBL     x Transfusion(s)  12/27/23 04:16   Group & Rh O POS   INDIRECT PAPA NEG   Specimen Outdate 12/30/2023 23:59   PREPARE RBC SOFT Rpt      Blood Bank   x Acute/Chronic illness Alcoholic cirrhosis of liver with ascites  -has hepatologist in [place]   H&P 12/27    Treatments      Other       Provider, please specify diagnosis or diagnoses associated with above clinical findings.     [ x  ] Acute blood loss anemia      [   ] Anemia due to chronic disease (please specify): _________________     [   ] Anemia, unspecified      [   ] Other Hematological Diagnosis (please  specify): _________________         Form No. [id number]

## 2024-04-01 PROBLEM — K92.2 GI BLEED: Status: RESOLVED | Noted: 2023-12-27 | Resolved: 2024-04-01
